# Patient Record
Sex: FEMALE | Race: WHITE | NOT HISPANIC OR LATINO | Employment: OTHER | URBAN - METROPOLITAN AREA
[De-identification: names, ages, dates, MRNs, and addresses within clinical notes are randomized per-mention and may not be internally consistent; named-entity substitution may affect disease eponyms.]

---

## 2017-04-06 ENCOUNTER — ALLSCRIPTS OFFICE VISIT (OUTPATIENT)
Dept: OTHER | Facility: OTHER | Age: 62
End: 2017-04-06

## 2018-01-13 VITALS — WEIGHT: 233 LBS | HEART RATE: 64 BPM | SYSTOLIC BLOOD PRESSURE: 154 MMHG | DIASTOLIC BLOOD PRESSURE: 84 MMHG

## 2018-01-25 ENCOUNTER — OFFICE VISIT (OUTPATIENT)
Dept: NEUROLOGY | Facility: CLINIC | Age: 63
End: 2018-01-25
Payer: COMMERCIAL

## 2018-01-25 VITALS
SYSTOLIC BLOOD PRESSURE: 172 MMHG | DIASTOLIC BLOOD PRESSURE: 81 MMHG | BODY MASS INDEX: 34.36 KG/M2 | HEIGHT: 69 IN | HEART RATE: 68 BPM | WEIGHT: 232 LBS

## 2018-01-25 DIAGNOSIS — G25.0 TREMOR, ESSENTIAL: Primary | ICD-10-CM

## 2018-01-25 PROCEDURE — 99214 OFFICE O/P EST MOD 30 MIN: CPT | Performed by: PSYCHIATRY & NEUROLOGY

## 2018-01-25 RX ORDER — METOPROLOL SUCCINATE 50 MG/1
50 TABLET, EXTENDED RELEASE ORAL DAILY
Refills: 0 | Status: CANCELLED | OUTPATIENT
Start: 2018-01-25

## 2018-01-25 RX ORDER — METOPROLOL SUCCINATE 100 MG/1
100 TABLET, EXTENDED RELEASE ORAL DAILY
Refills: 3 | COMMUNITY
Start: 2017-12-14 | End: 2018-01-25 | Stop reason: SDUPTHER

## 2018-01-25 RX ORDER — METOPROLOL SUCCINATE 100 MG/1
100 TABLET, EXTENDED RELEASE ORAL DAILY
Qty: 30 TABLET | Refills: 11 | Status: SHIPPED | OUTPATIENT
Start: 2018-01-25 | End: 2019-01-23 | Stop reason: SDUPTHER

## 2018-01-25 RX ORDER — KETOCONAZOLE 20 MG/G
CREAM TOPICAL
COMMUNITY
Start: 2016-04-14

## 2018-01-25 RX ORDER — AMOXICILLIN AND CLAVULANATE POTASSIUM 875; 125 MG/1; MG/1
1 TABLET, FILM COATED ORAL 2 TIMES DAILY
COMMUNITY
Start: 2018-01-17 | End: 2018-01-27

## 2018-01-25 RX ORDER — HYDROCHLOROTHIAZIDE 12.5 MG/1
1 TABLET ORAL DAILY
COMMUNITY
Start: 2017-12-04

## 2018-01-25 NOTE — PATIENT INSTRUCTIONS
Essential tremor with slight progression but functioning well  We discussed option to increase metoprolol vs adding another medication such as primidone or topiramate  We opted to increase metoprolol  Will increase to Metoprolol XL 100mg daily  She will check her blood pressure and pulse at home and call should she have hypotension, lightheadedness, fatigue or low pulse

## 2018-01-25 NOTE — PROGRESS NOTES
Patient ID: Jennifer Feldman is a 57 y o  female  Assessment/Plan:    Essential tremor with slight progression but functioning well  We discussed option to increase metoprolol vs adding another medication such as primidone or topiramate  We opted to increase metoprolol  Will increase to Metoprolol XL 100mg daily  She will check her blood pressure and pulse at home and call should she have hypotension, lightheadedness, fatigue or low pulse  Subjective:    Patient with essential tremor who presents for follow up  To review, first seen 10/27/14 but tremors were mild so opted to have no treatment  She was diagnosed in [place] by her PCP who noted a vocal tremor during a routine visit in August 2010  Tremor has gradually spread to her head and hands  Given progression she was started on Metoprolol with improvement  Her mother had tremors  She is doing well overall  Now her right hand is worse than the left  She is eating and drinking well  She is writing without issues however the tremors will sometimes appear  She has stopped serving others hot drinks  She is able to perform all of her ADLs well  Stress will make it worse or if someone points them out  She can have some mild issues putting on makeup  Every once in awhile she notices her jaw shake but not her head  Her  will tell her the head shakes at times  The following portions of the patient's history were reviewed and updated as appropriate: allergies, current medications, past family history, past medical history, past social history, past surgical history and problem list            Objective:    Blood pressure (!) 172/81, pulse 68, height 5' 9" (1 753 m), weight 105 kg (232 lb)  Physical Exam   Neurological: She has normal strength and normal reflexes  Gait normal    Psychiatric: Her speech is normal        Neurological Exam    Mental Status  The patient is alert and oriented to person, place, time, and situation   Her speech is normal  She has normal attention span and concentration  She has a normal fund of knowledge  Cranial Nerves  CN I: The patient has not tested  CN II: The patient's visual acuity and visual fields are normal   CN III, IV, VI:  The patient's pupils are equal  Her right pupil is, regular in shape  Her left pupil is, regular in shape  CN VII:  The patient has symmetric facial movement  CN VIII:  The patient's hearing is normal   CN XI: The patient's shoulder shrug strength is normal   CN XII: The patient's tongue is midline without atrophy or fasciculations  Motor   Her overall muscle tone is normal throughout  Her strength is 5/5 throughout all four extremities  Sensory  The patient's sensation is to light touch  Reflexes  Deep tendon reflexes are 2+ and symmetric in all four extremities with downgoing toes bilaterally  Gait and Coordination  The patient has normal gait and station  Mild postural tremors bilaterally  Mild tremor on finger to nose bilaterally worse on the right  Intermittent mild no-no head tremor  Subtle vocal tremor  Spirals with moderate tremor bilaterally  Handwriting normal but large  ROS:    Review of Systems   Constitutional: Negative  HENT: Positive for dental problem  Eyes: Negative  Respiratory: Negative  Cardiovascular: Negative  Gastrointestinal: Negative  Endocrine: Negative  Genitourinary: Positive for frequency  Musculoskeletal: Negative  Skin: Negative  Allergic/Immunologic: Negative  Neurological: Positive for tremors  Hematological: Negative  Psychiatric/Behavioral: Negative

## 2019-01-23 DIAGNOSIS — G25.0 TREMOR, ESSENTIAL: ICD-10-CM

## 2019-01-24 RX ORDER — METOPROLOL SUCCINATE 100 MG/1
TABLET, EXTENDED RELEASE ORAL
Qty: 30 TABLET | Refills: 11 | Status: SHIPPED | OUTPATIENT
Start: 2019-01-24 | End: 2020-09-03 | Stop reason: SDUPTHER

## 2020-09-03 ENCOUNTER — OFFICE VISIT (OUTPATIENT)
Dept: NEUROLOGY | Facility: CLINIC | Age: 65
End: 2020-09-03
Payer: COMMERCIAL

## 2020-09-03 VITALS — TEMPERATURE: 98.2 F | SYSTOLIC BLOOD PRESSURE: 135 MMHG | HEART RATE: 63 BPM | DIASTOLIC BLOOD PRESSURE: 67 MMHG

## 2020-09-03 DIAGNOSIS — G25.0 TREMOR, ESSENTIAL: Primary | ICD-10-CM

## 2020-09-03 PROCEDURE — 99214 OFFICE O/P EST MOD 30 MIN: CPT | Performed by: PSYCHIATRY & NEUROLOGY

## 2020-09-03 RX ORDER — METOPROLOL SUCCINATE 100 MG/1
100 TABLET, EXTENDED RELEASE ORAL DAILY
Qty: 90 TABLET | Refills: 3 | Status: SHIPPED | OUTPATIENT
Start: 2020-09-03

## 2020-09-03 RX ORDER — LISINOPRIL AND HYDROCHLOROTHIAZIDE 12.5; 1 MG/1; MG/1
1 TABLET ORAL DAILY
COMMUNITY
Start: 2020-06-30

## 2020-09-03 NOTE — PATIENT INSTRUCTIONS
We discussed option to increase metoprolol vs adding another medication such as primidone or topiramate    Also discussed MRI focused ultrasound surgery

## 2020-09-03 NOTE — PROGRESS NOTES
Patient ID: Buck Ely is a 77 y o  female  Assessment/Plan:    Tremor, essential  Progression of tremor  She has been able to adapt to her tremor throughout the years and is not keen on adding another medications  We did discuss options including  adding another medication such as primidone or topiramate  We discussed surgical options including DBS (which she is not interested in ) and MRI focused ultrasound  Questions regarding tremor, prognosis and options answered  Diagnoses and all orders for this visit:    Tremor, essential  -     metoprolol succinate (TOPROL-XL) 100 mg 24 hr tablet; Take 1 tablet (100 mg total) by mouth daily    Other orders  -     lisinopril-hydrochlorothiazide (PRINZIDE,ZESTORETIC) 10-12 5 MG per tablet; Take 1 tablet by mouth daily           Subjective:    Ms Kat Rose is a woman  essential tremor who presents for follow up  To review, first seen 10/27/14 but tremors were mild so opted to have no treatment  She was diagnosed in [place] by her PCP who noted a vocal tremor during a routine visit in August 2010  Tremor has gradually spread to her head and hands  Given progression she was started on Metoprolol with improvement  Her mother had tremors       Last seen in 2018  Tremor has worsened  She has bilateral hand tremors with action which is worse on the right  She is able to use utensils to eat without difficulty and drink  She will have trouble with carrying hot drinks or drinking from a mug  Handwriting is tremulous at times but legible  She is able to perform all of her ADLs without difficulty  Stress will make it worse  She can trouble putting on makeup  Every once in awhile she notices her jaw shake  Others have mentions an occasional head shake or vocal tremor  She remains on metoprolol with no side effects and partial improvement of tremor           The following portions of the patient's history were reviewed and updated as appropriate: allergies, current medications, past family history, past medical history, past social history, past surgical history and problem list          Objective:    Blood pressure 135/67, pulse 63, temperature 98 2 °F (36 8 °C)  Physical Exam  Eyes:      Extraocular Movements: Extraocular movements intact  Pupils: Pupils are equal, round, and reactive to light  Neurological:      Deep Tendon Reflexes: Strength normal    Psychiatric:         Speech: Speech normal          Neurological Exam  Mental Status   Oriented to person, place, time and situation  Recent and remote memory are intact  Speech is normal  Follows complex commands  Attention and concentration are normal     Cranial Nerves  CN II: Right funduscopic exam: not visualized  Left funduscopic exam: not visualized  CN III, IV, VI: Extraocular movements intact bilaterally  Pupils equal round and reactive to light bilaterally  CN V: Facial sensation is normal   CN VII: Full and symmetric facial movement  CN VIII: Hearing is normal   CN XI: Shoulder shrug strength is normal   Cranial nerve exam limited due to masking and patient comfort in setting of COVID  Motor   Normal muscle tone  Strength is 5/5 throughout all four extremities  Sensory  Light touch is normal in upper and lower extremities  Coordination  Right: Finger-to-nose abnormality: Rapid alternating movement normal   Left: Finger-to-nose abnormality: Rapid alternating movement normal   Moderate intentional tremor  Mild postural tremor  No rest tremor  Rare no-no had tremor  No vocal tremor  Handwriting normal    Spirals with moderate tremor       Gait  Casual gait is normal including stance, stride, and arm swing  Able to rise from chair without using arms  ROS:    Review of Systems   Constitutional: Negative  Negative for appetite change and fever  HENT: Negative  Negative for hearing loss, tinnitus, trouble swallowing and voice change  Eyes: Negative  Negative for photophobia and pain     Respiratory: Negative  Negative for shortness of breath  Cardiovascular: Negative  Negative for palpitations  Gastrointestinal: Negative  Negative for nausea and vomiting  Endocrine: Negative  Negative for cold intolerance  Genitourinary: Negative  Negative for dysuria, frequency and urgency  Musculoskeletal: Negative  Negative for myalgias and neck pain  Skin: Negative  Negative for rash  Allergic/Immunologic: Negative  Neurological: Positive for tremors (Hands daily)  Negative for dizziness, seizures, syncope, facial asymmetry, speech difficulty, weakness, light-headedness, numbness and headaches  Hematological: Negative  Does not bruise/bleed easily  Psychiatric/Behavioral: Negative  Negative for confusion, hallucinations and sleep disturbance  All other systems reviewed and are negative  Review of system was personally reviewed

## 2020-09-03 NOTE — ASSESSMENT & PLAN NOTE
Progression of tremor  She has been able to adapt to her tremor throughout the years and is not keen on adding another medications  We did discuss options including  adding another medication such as primidone or topiramate  We discussed surgical options including DBS (which she is not interested in ) and MRI focused ultrasound  Questions regarding tremor, prognosis and options answered

## 2020-09-09 RX ORDER — METOPROLOL SUCCINATE 100 MG/1
100 TABLET, EXTENDED RELEASE ORAL
COMMUNITY
Start: 2020-09-03 | End: 2021-10-22 | Stop reason: SDUPTHER

## 2020-09-09 RX ORDER — LISINOPRIL AND HYDROCHLOROTHIAZIDE 10; 12.5 MG/1; MG/1
1 TABLET ORAL
COMMUNITY
Start: 2020-06-30 | End: 2020-09-11 | Stop reason: SDUPTHER

## 2020-09-09 NOTE — PROGRESS NOTES
CC:   Chief Complaint   Patient presents with   • Establish Care     NP. old PCP was in [facility], retired. States she had a flu shot, up to date on mammo. Has essential tremors. Sees ortho for arthritis     Subjective   Alejandra Rocha is a 65 y.o. female presenting to establish care and discuss Chronic issues  ?  No acute complaints  Moved here a year and a half ago  Previously in [place]    #Type 2 diabetes: Last hemoglobin A1c: 6.5 10/2019  Current regimen: Declines diabetic meds, wants to try diet and weight loss.  Does not check sugars at home  Diabetic foot exam: last year  Diabetic eye exam: Earlier this year, may need to obtain records  Last Microalbumin: Due  Was going to the gym, then everything shut down   Was playing pickleball, arthritis in her knees    #Bilateral knee pain: follows with ortho , Provided her with a brace    #Essential tremor: Has been worsening over the last few years, saw neurology up in [facility], started on metoprolol    #HTN: current regimen: Currently on lisinopril-HCTZ 10-12.5 mg daily And metoprolol 100 mg daily as above  Denies any chest pain, shortness of breath, leg swelling  Patient doesnot check blood pressures at home    #HDL: not currently on  Statin, on baby asa  The ASCVD Risk score (Rupesh DC Jr., et al., 2013) failed to calculate for the following reasons:    Cannot find a previous HDL lab    Cannot find a previous total cholesterol lab      #Vitamin D deficiency Currently on 1000 IU daily  No results found for: VITD25      Work: Retired, 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 Patient is postmenopausal.  OB history:   GYN history:    Last mammogram 2020  Last diabetic eye exam 2019  Last DEXA scan 2019 And normal bone density      Patient History   Past Medical History:   Diagnosis Date   •  Arthritis    • Essential tremor    • Hypertension        History reviewed. No pertinent surgical history.  Family History   Problem Relation Age of Onset   • Arthritis Biological Mother    • Breast cancer Biological Mother    • Melanoma Biological Mother    • ALS Biological Father    • Kidney disease Biological Daughter      No Known Allergies  Social History     Socioeconomic History   • Marital status:      Spouse name: None   • Number of children: None   • Years of education: None   • Highest education level: None   Occupational History   • None   Social Needs   • Financial resource strain: None   • Food insecurity:     Worry: None     Inability: None   • Transportation needs:     Medical: None     Non-medical: None   Tobacco Use   • Smoking status: Never Smoker   • Smokeless tobacco: Never Used   Substance and Sexual Activity   • Alcohol use: Yes     Comment: Socially   • Drug use: Never   • Sexual activity: None   Lifestyle   • Physical activity:     Days per week: None     Minutes per session: None   • Stress: None   Relationships   • Social connections:     Talks on phone: None     Gets together: None     Attends [employer]ism service: None     Active member of club or organization: None     Attends meetings of clubs or organizations: None     Relationship status: None   • Intimate partner violence:     Fear of current or ex partner: None     Emotionally abused: None     Physically abused: None     Forced sexual activity: None   Other Topics Concern   • None   Social History Narrative   • None       Current Outpatient Medications:   •  aspirin 81 mg enteric coated tablet, Take 81 mg by mouth daily., Disp: , Rfl:   •  cholecalciferol, vitamin D3, 1,000 unit (25 mcg) tablet, Take 1,000 Units by mouth., Disp: , Rfl:   •  lisinopriL-hydrochlorothiazide (PRINZIDE,ZESTORETIC) 10-12.5 mg per tablet, Take 1 tablet by mouth daily., Disp: 90 tablet, Rfl: 3  •  metoprolol succinate XL (TOPROL-XL) 100 mg 24 hr tablet,  "Take 100 mg by mouth., Disp: , Rfl:   •  multivitamin tablet, Take by mouth daily., Disp: , Rfl:        Review of Systems   Review of Systems   Constitutional: Negative.    HENT: Negative.    Eyes: Negative.    Respiratory: Negative.    Cardiovascular: Negative.    Gastrointestinal: Negative.    Endocrine: Negative.    Genitourinary: Negative.    Musculoskeletal: Positive for arthralgias and joint swelling.   Skin: Negative.    Neurological: Positive for tremors.   Hematological: Negative.    Psychiatric/Behavioral: Negative.          Objective   Physical Exam:  Vitals:    09/11/20 0956 09/11/20 1019   BP: (!) 144/90 130/78   BP Location: Left upper arm    Patient Position: Sitting    Pulse: 75    Resp: 16    SpO2: 99%    Weight: 108 kg (238 lb 9.6 oz)    Height: 1.778 m (5' 10\")      Physical Exam   Constitutional: She is oriented to person, place, and time. She appears well-developed and well-nourished.   HENT:   Head: Normocephalic and atraumatic.   Eyes: Conjunctivae and EOM are normal.   Neck: Normal range of motion. Neck supple.   Cardiovascular: Normal rate, regular rhythm and normal heart sounds.   Pulmonary/Chest: Effort normal and breath sounds normal.   Abdominal: Soft. Bowel sounds are normal.   Musculoskeletal: Normal range of motion. She exhibits no edema.   Neurological: She is alert and oriented to person, place, and time. She displays tremor.   Skin: Skin is warm and dry.   Psychiatric: She has a normal mood and affect. Her behavior is normal. Judgment and thought content normal.       ?  Assessment/Plan   Alejandra Rocha is a 65 y.o. female presenting for   Chief Complaint   Patient presents with   • Establish Care     NP. old PCP was in [facility], retired. States she had a flu shot, up to date on mammo. Has essential tremors. Sees ortho for arthritis      Diagnosis Plan   1. Type 2 diabetes mellitus without complication, without long-term current use of insulin (CMS/[facility])  CBC and Differential    " Comprehensive metabolic panel    Microalbumin/Creatinine Ur Random    Hemoglobin A1c    CBC and Differential    Comprehensive metabolic panel    Microalbumin/Creatinine Ur Random    Hemoglobin A1c    Currently diet controlled, recheck hemoglobin A1c.  Due for diabetic foot exam (not done today).  If well controlled follow-up 6 months, if not 3 months   2. Hypertension associated with diabetes (CMS/[facility])  lisinopriL-hydrochlorothiazide (PRINZIDE,ZESTORETIC) 10-12.5 mg per tablet    Currently well controlled on medication, continue   3. Hyperlipidemia associated with type 2 diabetes mellitus (CMS/[facility])  Lipid panel    Lipid panel    Not currently on statin, recheck cholesterol and recalculate ASCVD risk-- Has been hypertriglyceridemia in the past, consider fish oil supplement   4. Essential tremor  TSH w reflex FT4    TSH w reflex FT4    Follows with neurology, currently on metoprolol and well controlled   5. Chronic pain of both knees      Follows with Ortho, recent brace prescription   6. Vitamin D deficiency  Vitamin D 25 hydroxy    Vitamin D 25 hydroxy    Currently on supplement, recheck level and dose accordingly   7. Colon cancer screening  FIT    FIT    Fit test this year, colonoscopy next year   8. Need for hepatitis C screening test  Hepatitis C antibody    Hepatitis C antibody     #Health Maintenance: Mammogram and DEXA up-to-date, foot fit test today  - Immunizations: Up-to-date with flu, shingles, pneumonia vaccine- Need records from her previous PCP    Return in about 6 months (around 3/11/2021), or 3 months if DM not controlled on labs.

## 2020-09-11 ENCOUNTER — OFFICE VISIT (OUTPATIENT)
Dept: FAMILY MEDICINE | Facility: CLINIC | Age: 65
End: 2020-09-11
Payer: COMMERCIAL

## 2020-09-11 VITALS
OXYGEN SATURATION: 99 % | BODY MASS INDEX: 34.16 KG/M2 | DIASTOLIC BLOOD PRESSURE: 78 MMHG | SYSTOLIC BLOOD PRESSURE: 130 MMHG | RESPIRATION RATE: 16 BRPM | WEIGHT: 238.6 LBS | HEIGHT: 70 IN | HEART RATE: 75 BPM

## 2020-09-11 DIAGNOSIS — I15.2 HYPERTENSION ASSOCIATED WITH DIABETES (CMS/HCC): ICD-10-CM

## 2020-09-11 DIAGNOSIS — Z11.59 NEED FOR HEPATITIS C SCREENING TEST: ICD-10-CM

## 2020-09-11 DIAGNOSIS — M25.562 CHRONIC PAIN OF BOTH KNEES: ICD-10-CM

## 2020-09-11 DIAGNOSIS — G89.29 CHRONIC PAIN OF BOTH KNEES: ICD-10-CM

## 2020-09-11 DIAGNOSIS — E78.5 HYPERLIPIDEMIA ASSOCIATED WITH TYPE 2 DIABETES MELLITUS (CMS/HCC): ICD-10-CM

## 2020-09-11 DIAGNOSIS — E11.69 HYPERLIPIDEMIA ASSOCIATED WITH TYPE 2 DIABETES MELLITUS (CMS/HCC): ICD-10-CM

## 2020-09-11 DIAGNOSIS — E11.59 HYPERTENSION ASSOCIATED WITH DIABETES (CMS/HCC): ICD-10-CM

## 2020-09-11 DIAGNOSIS — E55.9 VITAMIN D DEFICIENCY: ICD-10-CM

## 2020-09-11 DIAGNOSIS — E11.9 TYPE 2 DIABETES MELLITUS WITHOUT COMPLICATION, WITHOUT LONG-TERM CURRENT USE OF INSULIN (CMS/HCC): Primary | ICD-10-CM

## 2020-09-11 DIAGNOSIS — M25.561 CHRONIC PAIN OF BOTH KNEES: ICD-10-CM

## 2020-09-11 DIAGNOSIS — G25.0 ESSENTIAL TREMOR: ICD-10-CM

## 2020-09-11 DIAGNOSIS — Z12.11 COLON CANCER SCREENING: ICD-10-CM

## 2020-09-11 PROCEDURE — 99204 OFFICE O/P NEW MOD 45 MIN: CPT | Performed by: FAMILY MEDICINE

## 2020-09-11 RX ORDER — LISINOPRIL AND HYDROCHLOROTHIAZIDE 10; 12.5 MG/1; MG/1
1 TABLET ORAL DAILY
Qty: 90 TABLET | Refills: 3 | Status: SHIPPED | OUTPATIENT
Start: 2020-09-11 | End: 2021-09-10

## 2020-09-11 RX ORDER — CHOLECALCIFEROL (VITAMIN D3) 25 MCG
1000 TABLET ORAL
COMMUNITY

## 2020-09-11 RX ORDER — ASPIRIN 81 MG/1
81 TABLET ORAL DAILY
COMMUNITY
Start: 2019-11-08 | End: 2021-10-22

## 2020-09-11 ASSESSMENT — ENCOUNTER SYMPTOMS
ARTHRALGIAS: 1
RESPIRATORY NEGATIVE: 1
ENDOCRINE NEGATIVE: 1
CARDIOVASCULAR NEGATIVE: 1
TREMORS: 1
PSYCHIATRIC NEGATIVE: 1
JOINT SWELLING: 1
CONSTITUTIONAL NEGATIVE: 1
GASTROINTESTINAL NEGATIVE: 1
HEMATOLOGIC/LYMPHATIC NEGATIVE: 1
EYES NEGATIVE: 1

## 2020-09-11 ASSESSMENT — PAIN SCALES - GENERAL: PAINLEVEL: 0-NO PAIN

## 2020-11-12 LAB — FIT DNA: NEGATIVE

## 2020-11-23 LAB — HEMOCCULT STL QL IA: NEGATIVE

## 2020-12-08 ENCOUNTER — TELEPHONE (OUTPATIENT)
Dept: FAMILY MEDICINE | Facility: CLINIC | Age: 65
End: 2020-12-08

## 2021-01-22 ENCOUNTER — TELEPHONE (OUTPATIENT)
Dept: FAMILY MEDICINE | Facility: CLINIC | Age: 66
End: 2021-01-22

## 2021-01-22 NOTE — TELEPHONE ENCOUNTER
LM for patient, we got an MRO record request mailed to us on her behalf. However there's nothing filled out on her end. [facility] sent us this request. I just called patient to clarify what was needed.

## 2021-03-30 DIAGNOSIS — Z23 ENCOUNTER FOR IMMUNIZATION: ICD-10-CM

## 2021-10-13 DIAGNOSIS — E11.59 HYPERTENSION ASSOCIATED WITH DIABETES (CMS/HCC): ICD-10-CM

## 2021-10-13 DIAGNOSIS — I15.2 HYPERTENSION ASSOCIATED WITH DIABETES (CMS/HCC): ICD-10-CM

## 2021-10-13 RX ORDER — LISINOPRIL AND HYDROCHLOROTHIAZIDE 10; 12.5 MG/1; MG/1
TABLET ORAL
Qty: 30 TABLET | Refills: 0 | Status: SHIPPED | OUTPATIENT
Start: 2021-10-13 | End: 2021-10-22 | Stop reason: SDUPTHER

## 2021-10-20 ASSESSMENT — ENCOUNTER SYMPTOMS
GASTROINTESTINAL NEGATIVE: 1
HEMATOLOGIC/LYMPHATIC NEGATIVE: 1
ENDOCRINE NEGATIVE: 1
EYES NEGATIVE: 1
PSYCHIATRIC NEGATIVE: 1
TREMORS: 1
CARDIOVASCULAR NEGATIVE: 1
JOINT SWELLING: 1
RESPIRATORY NEGATIVE: 1
CONSTITUTIONAL NEGATIVE: 1
ARTHRALGIAS: 1

## 2021-10-20 NOTE — PROGRESS NOTES
CC:   Chief Complaint   Patient presents with   • Follow-up     pt. presents for a follow up. pt has no concerns at this time. Eye Exam at [facility] 2021     Subjective   Alejandra Rocha is a 66 y.o. female presenting for dollow-up  ?  No acute complaints    #Type 2 diabetes: Last hemoglobin A1c: 6.5 10/2019  Current regimen: Declines diabetic meds, wants to try diet and weight loss.  Does not check sugars at home  Diabetic foot exam: due  Diabetic eye exam: Earlier this year, may need to obtain records  Last Microalbumin: Due  Was going to the gym, then everything shut down   Was playing pickleball, arthritis in her knees    #Essential tremor: Has been worsening over the last few years, saw neurology up in [facility], started on metoprolol  Will be seeing them     #HTN: current regimen: Currently on lisinopril-HCTZ 10-12.5 mg daily And metoprolol 100 mg daily as above  Denies any chest pain, shortness of breath, leg swelling  Patient doesnot check blood pressures at home    #HDL: not currently on  Statin, on baby asa  The ASCVD Risk score (Rupeshhenry CATHERINE Jr., et al., 2013) failed to calculate for the following reasons:    Cannot find a previous HDL lab    Cannot find a previous total cholesterol lab    #Vitamin D deficiency Currently on 1000 IU daily  No results found for: VITD25    Work: Retired, 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 Patient is postmenopausal.  OB history:   GYN history:    Last mammogram 2020  Last diabetic eye exam 2019  Last DEXA scan 2019 And normal bone density      Review of Systems   Review of Systems   Constitutional: Negative.    HENT: Negative.    Eyes: Negative.    Respiratory: Negative.    Cardiovascular: Negative.    Gastrointestinal: Negative.    Endocrine: Negative.    Genitourinary: Negative.   "  Musculoskeletal: Positive for arthralgias and joint swelling.   Skin: Negative.    Neurological: Positive for tremors.   Hematological: Negative.    Psychiatric/Behavioral: Negative.          Objective   Physical Exam:  Vitals:    10/22/21 1043 10/22/21 1106   BP: (!) 144/86 130/78   BP Location: Right upper arm    Patient Position: Sitting    Pulse: 67    Resp: 17    Temp: 36.2 °C (97.2 °F)    TempSrc: Temporal    SpO2: 96%    Weight: 107 kg (235 lb)    Height: 1.753 m (5' 9\")      Physical Exam  Constitutional:       Appearance: She is well-developed.   HENT:      Head: Normocephalic and atraumatic.   Eyes:      Conjunctiva/sclera: Conjunctivae normal.   Cardiovascular:      Rate and Rhythm: Normal rate and regular rhythm.      Heart sounds: Normal heart sounds.   Pulmonary:      Effort: Pulmonary effort is normal.      Breath sounds: Normal breath sounds.   Abdominal:      General: Bowel sounds are normal.      Palpations: Abdomen is soft.   Musculoskeletal:         General: Normal range of motion.      Cervical back: Normal range of motion and neck supple.   Skin:     General: Skin is warm and dry.   Neurological:      Mental Status: She is alert and oriented to person, place, and time.      Motor: Tremor present.   Psychiatric:         Behavior: Behavior normal.         Thought Content: Thought content normal.         Judgment: Judgment normal.       Diabetic foot exam:  Right Foot: skin intact, neurovascularly intact, monofilament felt in all fields  Left Foot: skin intact, neurovascularly intact Monofilament felt in all fields      ?  Assessment/Plan   Alejandra Rocha is a 66 y.o. female presenting for   Chief Complaint   Patient presents with   • Follow-up     pt. presents for a follow up. pt has no concerns at this time. Eye Exam at [facility] 09/2021      Diagnosis Plan   1. Type 2 diabetes mellitus without complication, without long-term current use of insulin (CMS/[facility])  CBC and Differential    " Comprehensive metabolic panel    Hemoglobin A1c    Microalbumin/Creatinine Ur Random    DIABETIC FOOT EXAM    CBC and Differential    Comprehensive metabolic panel    Hemoglobin A1c    Microalbumin/Creatinine Ur Random    Only 1 A1c 6.5-- Officially due for repeat blood work, will check and dose accordingly; Need eye doctor exam- Low threshold t/c Jardiance vs metformin   2. Hypertension associated with diabetes (CMS/[facility])  Comprehensive metabolic panel    Comprehensive metabolic panel    lisinopriL-hydrochlorothiazide 10-12.5 mg per tablet    Well-controlled on current regimen, recheck labs as above; Initially elevated, improved on repeat, can consider increasing dose if needed   3. Hyperlipidemia associated with type 2 diabetes mellitus (CMS/[facility])  Lipid panel    Lipid panel    Recheck lipids and dose according-- Recommend discontinue Baby aspirin as Primary prevention   4. Essential tremor  metoprolol succinate  mg 24 hr tablet    On metoprolol through neurology, would not increase given her heart rate.  They are considering primidone, okay to try at low doses Through neurology   5. Vitamin D deficiency  Vitamin D 25 hydroxy    Vitamin D 25 hydroxy    Recheck level and dose accordingly   6. Chronic pain of both knees      With overall arthralgias, will check BMP, if kidney function is normal, we can consider Celebrex to be used as needed, otherwise Tylenol up to 1000mg TID   7. Need for hepatitis C screening test  Hepatitis C antibody    Hepatitis C antibody   8. Colon cancer screening  Fecal Immunochemical    Fecal Immunochemical     #Health Maintenance: Mammogram and DEXA up-to-date, fit test today  - Immunizations: Up-to-date     Return in about 6 months (around 4/22/2022) for PE.

## 2021-10-22 ENCOUNTER — OFFICE VISIT (OUTPATIENT)
Dept: FAMILY MEDICINE | Facility: CLINIC | Age: 66
End: 2021-10-22
Payer: COMMERCIAL

## 2021-10-22 VITALS
HEIGHT: 69 IN | HEART RATE: 67 BPM | DIASTOLIC BLOOD PRESSURE: 78 MMHG | TEMPERATURE: 97.2 F | OXYGEN SATURATION: 96 % | RESPIRATION RATE: 17 BRPM | BODY MASS INDEX: 34.8 KG/M2 | SYSTOLIC BLOOD PRESSURE: 130 MMHG | WEIGHT: 235 LBS

## 2021-10-22 DIAGNOSIS — Z12.11 COLON CANCER SCREENING: ICD-10-CM

## 2021-10-22 DIAGNOSIS — E11.69 HYPERLIPIDEMIA ASSOCIATED WITH TYPE 2 DIABETES MELLITUS (CMS/HCC): ICD-10-CM

## 2021-10-22 DIAGNOSIS — E11.59 HYPERTENSION ASSOCIATED WITH DIABETES (CMS/HCC): ICD-10-CM

## 2021-10-22 DIAGNOSIS — M25.562 CHRONIC PAIN OF BOTH KNEES: ICD-10-CM

## 2021-10-22 DIAGNOSIS — M25.561 CHRONIC PAIN OF BOTH KNEES: ICD-10-CM

## 2021-10-22 DIAGNOSIS — G89.29 CHRONIC PAIN OF BOTH KNEES: ICD-10-CM

## 2021-10-22 DIAGNOSIS — E78.5 HYPERLIPIDEMIA ASSOCIATED WITH TYPE 2 DIABETES MELLITUS (CMS/HCC): ICD-10-CM

## 2021-10-22 DIAGNOSIS — Z11.59 NEED FOR HEPATITIS C SCREENING TEST: ICD-10-CM

## 2021-10-22 DIAGNOSIS — E11.9 TYPE 2 DIABETES MELLITUS WITHOUT COMPLICATION, WITHOUT LONG-TERM CURRENT USE OF INSULIN (CMS/HCC): Primary | ICD-10-CM

## 2021-10-22 DIAGNOSIS — G25.0 ESSENTIAL TREMOR: ICD-10-CM

## 2021-10-22 DIAGNOSIS — E55.9 VITAMIN D DEFICIENCY: ICD-10-CM

## 2021-10-22 DIAGNOSIS — I15.2 HYPERTENSION ASSOCIATED WITH DIABETES (CMS/HCC): ICD-10-CM

## 2021-10-22 PROCEDURE — 3078F DIAST BP <80 MM HG: CPT | Performed by: FAMILY MEDICINE

## 2021-10-22 PROCEDURE — 3008F BODY MASS INDEX DOCD: CPT | Performed by: FAMILY MEDICINE

## 2021-10-22 PROCEDURE — 3075F SYST BP GE 130 - 139MM HG: CPT | Performed by: FAMILY MEDICINE

## 2021-10-22 PROCEDURE — 99214 OFFICE O/P EST MOD 30 MIN: CPT | Performed by: FAMILY MEDICINE

## 2021-10-22 RX ORDER — LISINOPRIL AND HYDROCHLOROTHIAZIDE 10; 12.5 MG/1; MG/1
1 TABLET ORAL
Qty: 90 TABLET | Refills: 3 | Status: SHIPPED | OUTPATIENT
Start: 2021-10-22 | End: 2022-10-10

## 2021-10-22 RX ORDER — METOPROLOL SUCCINATE 100 MG/1
100 TABLET, EXTENDED RELEASE ORAL DAILY
Qty: 90 TABLET | Refills: 3 | Status: SHIPPED | OUTPATIENT
Start: 2021-10-22 | End: 2022-09-29

## 2021-11-05 LAB
BASOPHILS # BLD AUTO: 0.1 X10E3/UL (ref 0–0.2)
BASOPHILS NFR BLD AUTO: 1 %
EOSINOPHIL # BLD AUTO: 0.2 X10E3/UL (ref 0–0.4)
EOSINOPHIL NFR BLD AUTO: 3 %
ERYTHROCYTE [DISTWIDTH] IN BLOOD BY AUTOMATED COUNT: 13.1 % (ref 11.7–15.4)
HCT VFR BLD AUTO: 39.6 % (ref 34–46.6)
HGB BLD-MCNC: 13.2 G/DL (ref 11.1–15.9)
IMM GRANULOCYTES # BLD AUTO: 0 X10E3/UL (ref 0–0.1)
IMM GRANULOCYTES NFR BLD AUTO: 0 %
LYMPHOCYTES # BLD AUTO: 2.7 X10E3/UL (ref 0.7–3.1)
LYMPHOCYTES NFR BLD AUTO: 45 %
MCH RBC QN AUTO: 29.2 PG (ref 26.6–33)
MCHC RBC AUTO-ENTMCNC: 33.3 G/DL (ref 31.5–35.7)
MCV RBC AUTO: 88 FL (ref 79–97)
MONOCYTES # BLD AUTO: 0.5 X10E3/UL (ref 0.1–0.9)
MONOCYTES NFR BLD AUTO: 8 %
NEUTROPHILS # BLD AUTO: 2.6 X10E3/UL (ref 1.4–7)
NEUTROPHILS NFR BLD AUTO: 43 %
PLATELET # BLD AUTO: 253 X10E3/UL (ref 150–450)
RBC # BLD AUTO: 4.52 X10E6/UL (ref 3.77–5.28)
WBC # BLD AUTO: 6 X10E3/UL (ref 3.4–10.8)

## 2021-11-06 LAB
25(OH)D3+25(OH)D2 SERPL-MCNC: 61.2 NG/ML (ref 30–100)
ALBUMIN SERPL-MCNC: 4.5 G/DL (ref 3.8–4.8)
ALBUMIN/CREAT UR: 21 MG/G CREAT (ref 0–29)
ALBUMIN/GLOB SERPL: 1.6 {RATIO} (ref 1.2–2.2)
ALP SERPL-CCNC: 96 IU/L (ref 44–121)
ALT SERPL-CCNC: 40 IU/L (ref 0–32)
AST SERPL-CCNC: 32 IU/L (ref 0–40)
BILIRUB SERPL-MCNC: 0.2 MG/DL (ref 0–1.2)
BUN SERPL-MCNC: 20 MG/DL (ref 8–27)
BUN/CREAT SERPL: 24 (ref 12–28)
CALCIUM SERPL-MCNC: 10.1 MG/DL (ref 8.7–10.3)
CHLORIDE SERPL-SCNC: 102 MMOL/L (ref 96–106)
CHOLEST SERPL-MCNC: 178 MG/DL (ref 100–199)
CO2 SERPL-SCNC: 20 MMOL/L (ref 20–29)
CREAT SERPL-MCNC: 0.84 MG/DL (ref 0.57–1)
CREAT UR-MCNC: 144.7 MG/DL
GLOBULIN SER CALC-MCNC: 2.8 G/DL (ref 1.5–4.5)
GLUCOSE SERPL-MCNC: 101 MG/DL (ref 65–99)
HBA1C MFR BLD: 6.2 % (ref 4.8–5.6)
HCV AB S/CO SERPL IA: <0.1 S/CO RATIO (ref 0–0.9)
HDLC SERPL-MCNC: 47 MG/DL
LAB CORP EGFR IF AFRICN AM: 83 ML/MIN/1.73
LAB CORP EGFR IF NONAFRICN AM: 72 ML/MIN/1.73
LDLC SERPL CALC-MCNC: 108 MG/DL (ref 0–99)
MICROALBUMIN UR-MCNC: 30.7 UG/ML
POTASSIUM SERPL-SCNC: 4.3 MMOL/L (ref 3.5–5.2)
PROT SERPL-MCNC: 7.3 G/DL (ref 6–8.5)
SODIUM SERPL-SCNC: 141 MMOL/L (ref 134–144)
TRIGL SERPL-MCNC: 127 MG/DL (ref 0–149)
VLDLC SERPL CALC-MCNC: 23 MG/DL (ref 5–40)

## 2021-11-10 ENCOUNTER — TELEPHONE (OUTPATIENT)
Dept: FAMILY MEDICINE | Facility: CLINIC | Age: 66
End: 2021-11-10

## 2021-11-10 DIAGNOSIS — E78.5 HYPERLIPIDEMIA ASSOCIATED WITH TYPE 2 DIABETES MELLITUS (CMS/HCC): ICD-10-CM

## 2021-11-10 DIAGNOSIS — E11.9 TYPE 2 DIABETES MELLITUS WITHOUT COMPLICATION, WITHOUT LONG-TERM CURRENT USE OF INSULIN (CMS/HCC): Primary | ICD-10-CM

## 2021-11-10 DIAGNOSIS — E11.69 HYPERLIPIDEMIA ASSOCIATED WITH TYPE 2 DIABETES MELLITUS (CMS/HCC): ICD-10-CM

## 2021-11-10 NOTE — TELEPHONE ENCOUNTER
Patient returned call to discuss lab results. Please call patient back at her home phone number to discuss the results.

## 2022-04-06 ENCOUNTER — OFFICE VISIT (OUTPATIENT)
Dept: NEUROLOGY | Facility: CLINIC | Age: 67
End: 2022-04-06
Payer: COMMERCIAL

## 2022-04-06 VITALS
DIASTOLIC BLOOD PRESSURE: 69 MMHG | TEMPERATURE: 98.3 F | HEART RATE: 67 BPM | WEIGHT: 239.6 LBS | HEIGHT: 69 IN | BODY MASS INDEX: 35.49 KG/M2 | SYSTOLIC BLOOD PRESSURE: 157 MMHG

## 2022-04-06 DIAGNOSIS — G25.0 TREMOR, ESSENTIAL: Primary | ICD-10-CM

## 2022-04-06 PROCEDURE — 99214 OFFICE O/P EST MOD 30 MIN: CPT | Performed by: PSYCHIATRY & NEUROLOGY

## 2022-04-06 RX ORDER — METOPROLOL SUCCINATE 25 MG/1
25 TABLET, EXTENDED RELEASE ORAL DAILY
Qty: 90 TABLET | Refills: 2 | Status: SHIPPED | OUTPATIENT
Start: 2022-04-06

## 2022-04-06 NOTE — ASSESSMENT & PLAN NOTE
Progression of tremor  At this point although she has been able to adapt to her tremor throughout the years, it is now more notable by others and she is willing to consider increases in medications  We discussed options such as increasing Toprol Xl, or adding a medication  Will increase Toprol Xl to 125mg daily  If no improvement and no side effects she can further increase to 150mg daily  If she develops side effects (lightheadness/ fatigue, etc) she was instructed to return to 100mg daily and contact the office  We will then try adding a medication such as primidone or topiramate  Surgical options of or ET previously discussed and she was not interested

## 2022-04-06 NOTE — PROGRESS NOTES
Review of Systems   Constitutional: Negative  Negative for appetite change and fever  HENT: Negative  Negative for hearing loss, tinnitus, trouble swallowing and voice change  Eyes: Negative  Negative for photophobia and pain  Respiratory: Negative  Negative for shortness of breath  Cardiovascular: Negative  Negative for palpitations  Gastrointestinal: Negative  Negative for nausea and vomiting  Endocrine: Negative  Negative for cold intolerance  Genitourinary: Negative  Negative for dysuria, frequency and urgency  Musculoskeletal: Positive for myalgias  Negative for neck pain  Skin: Negative  Negative for rash  Neurological: Positive for tremors  Negative for dizziness, seizures, syncope, facial asymmetry, speech difficulty, weakness, light-headedness, numbness and headaches  Hematological: Negative  Does not bruise/bleed easily  Psychiatric/Behavioral: Negative  Negative for confusion, hallucinations and sleep disturbance

## 2022-04-06 NOTE — PROGRESS NOTES
Patient ID: Izzy Garcia is a 74 y o  female    Assessment/Plan:    Tremor, essential  Progression of tremor  At this point although she has been able to adapt to her tremor throughout the years, it is now more notable by others and she is willing to consider increases in medications  We discussed options such as increasing Toprol Xl, or adding a medication  Will increase Toprol Xl to 125mg daily  If no improvement and no side effects she can further increase to 150mg daily  If she develops side effects (lightheadness/ fatigue, etc) she was instructed to return to 100mg daily and contact the office  We will then try adding a medication such as primidone or topiramate  Surgical options of or ET previously discussed and she was not interested  Diagnoses and all orders for this visit:    Tremor, essential  -     metoprolol succinate (TOPROL-XL) 25 mg 24 hr tablet; Take 1 tablet (25 mg total) by mouth daily Take one daily in addition to 100mg tablet (total 125mg daily)        Subjective:      Izzy Garcia is a woman with essential tremor who presents for follow up  To review, first seen 10/27/14 but tremors were mild so opted to have no treatment  She was diagnosed in [place] by her PCP who noted a vocal tremor during a routine visit in August 2010  Tremor has gradually spread to her head and hands  Given progression she was started on Metoprolol with improvement  Her mother had tremors       Last seen in 2020 with progression of tremor but she was not interested adding another medications or any surgical options      Questions regarding tremor, prognosis and options answered       She remains on Toprol Xl 100mg daily with partial improvement of tremor  Bilateral right greater than left action tremors continue to progressively get worse  She has noticed that alcohol responsive  Her children and  have noted progression  A vocal tremor has become more notable   She has jaw tremor and head tremor  She is able to use utensils to eat without difficulty and drink  Tremors are notable and she would not serve others  She has trouble putting on makeup  Stress or emotions worsen tremors  Handwriting is tremulous at times but legible  She is able to perform all of her ADLs without difficulty  No changes in gait  Objective:    /69 (BP Location: Left arm, Patient Position: Sitting, Cuff Size: Large)   Pulse 67   Temp 98 3 °F (36 8 °C)   Ht 5' 9" (1 753 m)   Wt 109 kg (239 lb 9 6 oz)   BMI 35 38 kg/m²       Physical Exam  Vitals reviewed  Eyes:      Extraocular Movements: Extraocular movements intact  Pupils: Pupils are equal, round, and reactive to light  Neurological:      Deep Tendon Reflexes: Strength normal    Psychiatric:         Speech: Speech normal          Neurological Exam  Mental Status   Oriented to person, place, time and situation  Recent and remote memory are intact  Speech is normal  Follows complex commands  Attention and concentration are normal     Cranial Nerves  CN II: Visual fields full to confrontation  Right funduscopic exam: not visualized  Left funduscopic exam: not visualized  CN III, IV, VI: Extraocular movements intact bilaterally  Pupils equal round and reactive to light bilaterally  CN V: Facial sensation is normal   CN VII: Full and symmetric facial movement  CN VIII: Hearing is normal   CN IX, X: Palate elevates symmetrically  CN XI: Shoulder shrug strength is normal   CN XII: Tongue midline without atrophy or fasciculations  Motor   Normal muscle tone  Strength is 5/5 throughout all four extremities  Sensory  Light touch is normal in upper and lower extremities  Coordination  Right: Finger-to-nose abnormality: Rapid alternating movement normal   Left: Finger-to-nose abnormality: Rapid alternating movement normal   Mild jaw tremor  Mild no-no head tremor  Moderate postural tremors L>R  Moderate action tremors L>R  No rest tremor       Gait  Casual gait is normal including stance, stride, and arm swing  Able to rise from chair without using arms  Current Outpatient Medications on File Prior to Visit   Medication Sig Dispense Refill    cholecalciferol (VITAMIN D3) 1,000 units tablet Take 1,000 Units by mouth daily      lisinopril-hydrochlorothiazide (PRINZIDE,ZESTORETIC) 10-12 5 MG per tablet Take 1 tablet by mouth daily      metoprolol succinate (TOPROL-XL) 100 mg 24 hr tablet Take 1 tablet (100 mg total) by mouth daily 90 tablet 3    Multiple Vitamins-Minerals (MULTIVITAL PO) Take 1 capsule by mouth daily      aspirin 81 MG tablet Take 81 mg by mouth daily (Patient not taking: Reported on 4/6/2022 )      hydrochlorothiazide (HYDRODIURIL) 12 5 mg tablet Take 1 tablet by mouth daily (Patient not taking: Reported on 4/6/2022 )      ketoconazole (NIZORAL) 2 % cream Use as directed (Patient not taking: Reported on 4/6/2022 )       No current facility-administered medications on file prior to visit  Review of Systems   Constitutional: Negative  Negative for appetite change and fever  HENT: Negative  Negative for hearing loss, tinnitus, trouble swallowing and voice change  Eyes: Negative  Negative for photophobia and pain  Respiratory: Negative  Negative for shortness of breath  Cardiovascular: Negative  Negative for palpitations  Gastrointestinal: Negative  Negative for nausea and vomiting  Endocrine: Negative  Negative for cold intolerance  Genitourinary: Negative  Negative for dysuria, frequency and urgency  Musculoskeletal: Positive for myalgias  Negative for neck pain  Skin: Negative  Negative for rash  Neurological: Positive for tremors  Negative for dizziness, seizures, syncope, facial asymmetry, speech difficulty, weakness, light-headedness, numbness and headaches  Hematological: Negative  Does not bruise/bleed easily  Psychiatric/Behavioral: Negative    Negative for confusion, hallucinations and sleep disturbance     Review of system documented by the MA, was personally reviewed     Jacqueline Medel MD  Movement disorder physician  [address] [place] [address]

## 2022-04-07 LAB — SPECIMEN STATUS: NORMAL

## 2022-04-12 ENCOUNTER — TELEPHONE (OUTPATIENT)
Dept: FAMILY MEDICINE | Facility: CLINIC | Age: 67
End: 2022-04-12
Payer: COMMERCIAL

## 2022-04-12 DIAGNOSIS — Z12.11 SCREENING FOR COLON CANCER: Primary | ICD-10-CM

## 2022-04-12 NOTE — TELEPHONE ENCOUNTER
Pt called and has a stool kit with no lab order in it. She would like to know if this can be provided. She is ready to drop the sample off a [facility].

## 2022-04-13 ENCOUNTER — TELEMEDICINE (OUTPATIENT)
Dept: FAMILY MEDICINE | Facility: CLINIC | Age: 67
End: 2022-04-13
Payer: COMMERCIAL

## 2022-04-13 DIAGNOSIS — J01.10 ACUTE NON-RECURRENT FRONTAL SINUSITIS: Primary | ICD-10-CM

## 2022-04-13 PROCEDURE — 99213 OFFICE O/P EST LOW 20 MIN: CPT | Mod: 95 | Performed by: NURSE PRACTITIONER

## 2022-04-13 RX ORDER — AMOXICILLIN AND CLAVULANATE POTASSIUM 875; 125 MG/1; MG/1
1 TABLET, FILM COATED ORAL 2 TIMES DAILY
Qty: 14 TABLET | Refills: 0 | Status: SHIPPED | OUTPATIENT
Start: 2022-04-13 | End: 2022-04-21

## 2022-04-13 ASSESSMENT — ENCOUNTER SYMPTOMS
FATIGUE: 0
VOMITING: 0
NAUSEA: 0
FACIAL ASYMMETRY: 0
HEADACHES: 0
HOARSE VOICE: 0
SORE THROAT: 0
APPETITE CHANGE: 0
MUSCULOSKELETAL NEGATIVE: 1
LIGHT-HEADEDNESS: 0
DIARRHEA: 0
NECK PAIN: 0
CHEST TIGHTNESS: 0
FACIAL SWELLING: 0
SHORTNESS OF BREATH: 0
SWOLLEN GLANDS: 0
SINUS PRESSURE: 1
CHILLS: 0
HEMATOLOGIC/LYMPHATIC NEGATIVE: 1
UNEXPECTED WEIGHT CHANGE: 0
EYES NEGATIVE: 1
CONSTIPATION: 0
ENDOCRINE NEGATIVE: 1
ALLERGIC/IMMUNOLOGIC NEGATIVE: 1
DIZZINESS: 0
CHOKING: 0
PSYCHIATRIC NEGATIVE: 1
FEVER: 0
SINUS PAIN: 1
DIAPHORESIS: 0
SINUS COMPLAINT: 1
COUGH: 1

## 2022-04-13 NOTE — PROGRESS NOTES
[facility]  [address] [place] [address]  [phone]       Verification of Patient Location:  The patient affirms they are currently located in the following state: [place]    Request for Consent:   Audio and Video Encounter   Hello, my name is DanaCHRISTAL Horvath.  Before we proceed, can you please verify your identification by telling me your full name and date of birth?  Can you tell me who is in the room with you?    You and I are about to have a telemedicine check-in or visit because you have requested it.  This is a live video-conference.  I am a real person, speaking to you in real time.  There is no one else with me on the video-conference.  However, when we use ([employer], [employer], etc) it is important for you to know that the video-conference may not be secure or private.  I am not recording this conversation and I am asking you not to record it.  This telemedicine visit will be billed to your health insurance or you, if you are self-insured.  You understand you will be responsible for any copayments or coinsurances that apply to your telemedicine visit.  Communication platform used for this encounter:  Doximity     Before starting our telemedicine visit, I am required to get your consent for this virtual check-in or visit by telemedicine. Do you consent?            Patient Response to Request for Consent:  Yes        Reason for visit:   Chief Complaint   Patient presents with   • Sinus Problem      HPI   Alejandra Rocha is a 66 y.o. female who presents with sinus concerns     covid test home negativ    Sinus Problem  This is a new problem. Episode onset: had a cold for a week started with a cough mucus  Progression since onset: sunday increase sinus pressure pain  There has been no fever. Associated symptoms include congestion, coughing (sputum, yellow ) and sinus pressure. Pertinent negatives include no chills, diaphoresis, ear pain, headaches, hoarse voice,  neck pain, shortness of breath, sneezing, sore throat or swollen glands. Treatments tried: dayquil/ night quil, mucinex tylenol            Medical History:  Past Medical History:   Diagnosis Date   • Arthritis    • Essential tremor    • Hypertension        Surgical History:  No past surgical history on file.    Social History:  Social History     Social History Narrative   • Not on file       Family History:  Family History   Problem Relation Age of Onset   • Arthritis Biological Mother    • Breast cancer Biological Mother    • Melanoma Biological Mother    • ALS Biological Father    • Kidney disease Biological Daughter        Allergies:  Patient has no known allergies.    Current Medications:  Current Outpatient Medications   Medication Sig Dispense Refill   • amoxicillin-pot clavulanate (AUGMENTIN) 875-125 mg per tablet Take 1 tablet by mouth 2 (two) times a day for 7 days. 14 tablet 0   • cholecalciferol, vitamin D3, 1,000 unit (25 mcg) tablet Take 1,000 Units by mouth.     • lisinopriL-hydrochlorothiazide 10-12.5 mg per tablet Take 1 tablet by mouth once daily. 90 tablet 3   • metoprolol succinate  mg 24 hr tablet Take 1 tablet (100 mg total) by mouth daily. 90 tablet 3   • multivitamin tablet Take by mouth daily.       No current facility-administered medications for this visit.       Review of Systems:  Review of Systems   Constitutional: Negative for appetite change, chills, diaphoresis, fatigue, fever and unexpected weight change.   HENT: Positive for congestion, sinus pressure and sinus pain (left side face). Negative for ear pain, facial swelling, hearing loss, hoarse voice, mouth sores, nosebleeds, postnasal drip, sneezing and sore throat.         Nasal discharge yellow    Eyes: Negative.    Respiratory: Positive for cough (sputum, yellow ). Negative for choking, chest tightness and shortness of breath.    Cardiovascular: Negative for chest pain.   Gastrointestinal: Negative for constipation,  diarrhea, nausea and vomiting.   Endocrine: Negative.    Genitourinary: Negative.    Musculoskeletal: Negative.  Negative for neck pain.   Skin: Negative.    Allergic/Immunologic: Negative.    Neurological: Negative for dizziness, facial asymmetry, light-headedness and headaches.   Hematological: Negative.    Psychiatric/Behavioral: Negative.        Objective     Vital Signs:  There were no vitals filed for this visit.    BMI:  There is no height or weight on file to calculate BMI.     Physical Exam    Recent labs before today:     Lab Results   Component Value Date    WBC 6.0 11/05/2021    HGB 13.2 11/05/2021    HCT 39.6 11/05/2021     11/05/2021    CHOL 178 11/05/2021    TRIG 127 11/05/2021    HDL 47 11/05/2021    ALT 40 (H) 11/05/2021    AST 32 11/05/2021     11/05/2021    K 4.3 11/05/2021     11/05/2021    CREATININE 0.84 11/05/2021    BUN 20 11/05/2021    CO2 20 11/05/2021    HGBA1C 6.2 (H) 11/05/2021    MICROALBUR 30.7 11/05/2021        Assessment/Plan   Problem List Items Addressed This Visit    None     Visit Diagnoses     Acute non-recurrent frontal sinusitis    -  Primary    Relevant Medications    amoxicillin-pot clavulanate (AUGMENTIN) 875-125 mg per tablet      Patient sounds to have developed a sinus infection from her viral illness of last week with increased sinus pressure pain discolored mucus coming out no other signs suggesting Covid or flu.  Advised patient to do supportive care of Mucinex plain advised patient with history of high blood pressure stay away from anything that has a D.  Advised of saline irrigation.  Will start on Augmentin 1 pill in the morning 1 pill at night for 7 days    Procedures     Due to the national medical emergency, this visit was conducted via telephone/ video.     Because of the COVID-19 pandemic, in order to limit patient contact and promote the safety of patients and the healthcare team, I did not physically examine the patient.   There are no  Patient Instructions on file for this visit.           Time Spent in Medical Discussion During This Encounter:  21 minutes    CHRISTAL Rico  4/13/2022

## 2022-04-16 LAB — HEMOCCULT STL QL IA: NEGATIVE

## 2022-04-21 ASSESSMENT — ENCOUNTER SYMPTOMS
ARTHRALGIAS: 1
GASTROINTESTINAL NEGATIVE: 1
PSYCHIATRIC NEGATIVE: 1
TREMORS: 1
ENDOCRINE NEGATIVE: 1
EYES NEGATIVE: 1
CONSTITUTIONAL NEGATIVE: 1
HEMATOLOGIC/LYMPHATIC NEGATIVE: 1
JOINT SWELLING: 1
RESPIRATORY NEGATIVE: 1
CARDIOVASCULAR NEGATIVE: 1

## 2022-04-21 NOTE — PROGRESS NOTES
CC:   Chief Complaint   Patient presents with   • Annual Exam     Established patient - PE no complaints   Mammagram - scheduled    Eye doctor- scheduled next week      Subjective   Alejandra Rocha is a 66 y.o. female presenting for Annual exam  ?  No acute complaints    Recent sinus infection, started on Augmentin, symptoms cleared up except for cough, now dry cough     #Type 2 diabetes: Last hemoglobin A1c: 6.5 x2 in 2019  Current regimen: not on diabetic meds, controls with diet and weight loss.  Lab Results   Component Value Date    HGBA1C 6.2 (H) 2021   Does not check sugars at home  Diabetic foot exam: 10/22/2021  Diabetic eye exam: 2021  Last Microalbumin: 2021    #Essential tremor: Has been worsening over the last few years, saw neurology up in [facility], started on metoprolol  Recently increased to 125 mg daily    #HTN: current regimen: Currently on lisinopril-HCTZ 10-12.5 mg daily And metoprolol 125 mg daily as above  Denies any chest pain, shortness of breath, leg swelling  Patient does check blood pressures at home- <140/70s    #HDL: Cholesterol had increased, patient declined statin and wanted to do diet  The 10-year ASCVD risk score ([place] CATHERINE Jr., et al., 2013) is: 18.8%    Values used to calculate the score:      Age: 66 years      Sex: Female      Is Non- : No      Diabetic: Yes      Tobacco smoker: No      Systolic Blood Pressure: 128 mmHg      Is BP treated: Yes      HDL Cholesterol: 47 mg/dL      Total Cholesterol: 178 mg/dL    #Vitamin D deficiency Currently on 1000 IU daily  No results found for: VITD25    Work: Retired, previously a teacher of [employer]   Relationship: , lois , currently in 55+ community-- [employer]  Family: 4kids, 5 grandkids--- 14yo down to 4yo       LMP: No LMP recorded. Patient is postmenopausal.  OB history:   GYN history: Follows with gynecology    Last mammogram - 2021 and normal  Last DEXA  "scan 1/17/2019 And normal bone density, Repeat at age 68  Fit test 4/12/2022    Review of Systems   Review of Systems   Constitutional: Negative.    HENT: Negative.    Eyes: Negative.    Respiratory: Negative.    Cardiovascular: Negative.    Gastrointestinal: Negative.    Endocrine: Negative.    Genitourinary: Negative.    Musculoskeletal: Positive for arthralgias and joint swelling.   Skin: Negative.    Neurological: Positive for tremors.   Hematological: Negative.    Psychiatric/Behavioral: Negative.        Objective   Physical Exam:  Vitals:    04/22/22 0911   BP: 128/78   BP Location: Right upper arm   Patient Position: Sitting   Pulse: 64   Resp: 16   Temp: 36.4 °C (97.5 °F)   TempSrc: Temporal   SpO2: 98%   Weight: 109 kg (240 lb 3.2 oz)   Height: 1.753 m (5' 9\")     Physical Exam  Constitutional:       Appearance: She is well-developed.   HENT:      Head: Normocephalic and atraumatic.   Eyes:      Conjunctiva/sclera: Conjunctivae normal.   Cardiovascular:      Rate and Rhythm: Normal rate and regular rhythm.      Heart sounds: Normal heart sounds.   Pulmonary:      Effort: Pulmonary effort is normal.      Breath sounds: Normal breath sounds.   Abdominal:      General: Bowel sounds are normal.      Palpations: Abdomen is soft.   Musculoskeletal:         General: Normal range of motion.      Cervical back: Normal range of motion and neck supple.   Skin:     General: Skin is warm and dry.   Neurological:      Mental Status: She is alert and oriented to person, place, and time.      Motor: Tremor present.   Psychiatric:         Behavior: Behavior normal.         Thought Content: Thought content normal.         Judgment: Judgment normal.       ?  Assessment/Plan   Alejandra Rocha is a 66 y.o. female presenting for   Chief Complaint   Patient presents with   • Annual Exam     Established patient - PE no complaints   Mammagram - scheduled June   Eye doctor- scheduled next week       Diagnosis Plan   1. Encounter for " general adult medical examination without abnormal findings      Overall doing well, discussed health maintenance and chronic issues as below   2. Diet-controlled type 2 diabetes mellitus (CMS/HCC)      With some recent weight gain but for the most part improving, plan to repeat labs in 2 to 3 months, Hesitant to go on meds if she can avoid it   3. Hypertension associated with diabetes (CMS/HCC)      Well-controlled on current regimen, continue   4. Hyperlipidemia associated with type 2 diabetes mellitus (CMS/HCC)      Repeating lipids in a few weeks, hesitant to go on statin but his numbers increased despite lifestyle changes, should consider statin   5. BMI 35.0-35.9,adult      She states she knows what to eat, just needs to start eating it and become more active, we will continue to monitor   6. Essential tremor      Now on 125 mg metoprolol and feeling somewhat better controlled     #Health Maintenance: Mammogram and DEXA up-to-date, fit test UTD  - Immunizations: Up-to-date     Return in about 6 months (around 10/22/2022).

## 2022-04-22 ENCOUNTER — OFFICE VISIT (OUTPATIENT)
Dept: FAMILY MEDICINE | Facility: CLINIC | Age: 67
End: 2022-04-22
Payer: COMMERCIAL

## 2022-04-22 VITALS
DIASTOLIC BLOOD PRESSURE: 78 MMHG | TEMPERATURE: 97.5 F | SYSTOLIC BLOOD PRESSURE: 128 MMHG | WEIGHT: 240.2 LBS | RESPIRATION RATE: 16 BRPM | HEART RATE: 64 BPM | BODY MASS INDEX: 35.58 KG/M2 | OXYGEN SATURATION: 98 % | HEIGHT: 69 IN

## 2022-04-22 DIAGNOSIS — E11.69 HYPERLIPIDEMIA ASSOCIATED WITH TYPE 2 DIABETES MELLITUS (CMS/HCC): ICD-10-CM

## 2022-04-22 DIAGNOSIS — Z00.00 ENCOUNTER FOR GENERAL ADULT MEDICAL EXAMINATION WITHOUT ABNORMAL FINDINGS: Primary | ICD-10-CM

## 2022-04-22 DIAGNOSIS — E11.59 HYPERTENSION ASSOCIATED WITH DIABETES (CMS/HCC): ICD-10-CM

## 2022-04-22 DIAGNOSIS — I15.2 HYPERTENSION ASSOCIATED WITH DIABETES (CMS/HCC): ICD-10-CM

## 2022-04-22 DIAGNOSIS — E11.9 DIET-CONTROLLED TYPE 2 DIABETES MELLITUS (CMS/HCC): ICD-10-CM

## 2022-04-22 DIAGNOSIS — E78.5 HYPERLIPIDEMIA ASSOCIATED WITH TYPE 2 DIABETES MELLITUS (CMS/HCC): ICD-10-CM

## 2022-04-22 DIAGNOSIS — G25.0 ESSENTIAL TREMOR: ICD-10-CM

## 2022-04-22 PROCEDURE — 3074F SYST BP LT 130 MM HG: CPT | Performed by: FAMILY MEDICINE

## 2022-04-22 PROCEDURE — 3008F BODY MASS INDEX DOCD: CPT | Performed by: FAMILY MEDICINE

## 2022-04-22 PROCEDURE — 3078F DIAST BP <80 MM HG: CPT | Performed by: FAMILY MEDICINE

## 2022-04-22 PROCEDURE — 99397 PER PM REEVAL EST PAT 65+ YR: CPT | Performed by: FAMILY MEDICINE

## 2022-04-22 RX ORDER — METOPROLOL SUCCINATE 25 MG/1
25 TABLET, EXTENDED RELEASE ORAL DAILY
COMMUNITY
End: 2023-08-11 | Stop reason: SDUPTHER

## 2022-04-22 RX ORDER — CLOTRIMAZOLE 1 %
CREAM (GRAM) TOPICAL 2 TIMES DAILY
COMMUNITY
End: 2023-01-06

## 2022-04-22 RX ORDER — ELECTROLYTES/DEXTROSE
SOLUTION, ORAL ORAL DAILY
COMMUNITY
End: 2024-02-20 | Stop reason: ALTCHOICE

## 2022-04-22 ASSESSMENT — PAIN SCALES - GENERAL: PAINLEVEL: 0-NO PAIN

## 2022-04-22 ASSESSMENT — PATIENT HEALTH QUESTIONNAIRE - PHQ9: SUM OF ALL RESPONSES TO PHQ9 QUESTIONS 1 & 2: 0

## 2022-04-27 ENCOUNTER — OFFICE VISIT (OUTPATIENT)
Dept: FAMILY MEDICINE | Facility: CLINIC | Age: 67
End: 2022-04-27
Payer: COMMERCIAL

## 2022-04-27 VITALS
SYSTOLIC BLOOD PRESSURE: 138 MMHG | BODY MASS INDEX: 35.43 KG/M2 | WEIGHT: 239.2 LBS | RESPIRATION RATE: 16 BRPM | DIASTOLIC BLOOD PRESSURE: 80 MMHG | HEIGHT: 69 IN | OXYGEN SATURATION: 97 % | HEART RATE: 75 BPM | TEMPERATURE: 97.3 F

## 2022-04-27 DIAGNOSIS — E11.9 DIET-CONTROLLED TYPE 2 DIABETES MELLITUS (CMS/HCC): ICD-10-CM

## 2022-04-27 DIAGNOSIS — N30.00 ACUTE CYSTITIS WITHOUT HEMATURIA: Primary | ICD-10-CM

## 2022-04-27 LAB
BILIRUBIN, POC: NEGATIVE
BLOOD URINE, POC: PRESENT
CLARITY, POC: CLEAR
COLOR, POC: YELLOW
EXPIRATION DATE: NORMAL
GLUCOSE URINE, POC: NEGATIVE
KETONES, POC: NEGATIVE
LEUKOCYTE EST, POC: NORMAL
Lab: NORMAL
NITRITE, POC: NEGATIVE
PH, POC: 6
POCT MANUFACTURER: NORMAL
PROTEIN, POC: NORMAL
SPECIFIC GRAVITY, POC: 1.02
UROBILINOGEN, POC: 0.2

## 2022-04-27 PROCEDURE — 3079F DIAST BP 80-89 MM HG: CPT | Performed by: FAMILY MEDICINE

## 2022-04-27 PROCEDURE — 3075F SYST BP GE 130 - 139MM HG: CPT | Performed by: FAMILY MEDICINE

## 2022-04-27 PROCEDURE — 81002 URINALYSIS NONAUTO W/O SCOPE: CPT | Performed by: FAMILY MEDICINE

## 2022-04-27 PROCEDURE — 3008F BODY MASS INDEX DOCD: CPT | Performed by: FAMILY MEDICINE

## 2022-04-27 PROCEDURE — 99213 OFFICE O/P EST LOW 20 MIN: CPT | Performed by: FAMILY MEDICINE

## 2022-04-27 RX ORDER — NITROFURANTOIN 25; 75 MG/1; MG/1
100 CAPSULE ORAL 2 TIMES DAILY
Qty: 10 CAPSULE | Refills: 0 | Status: SHIPPED | OUTPATIENT
Start: 2022-04-27 | End: 2022-05-02

## 2022-04-27 ASSESSMENT — PAIN SCALES - GENERAL: PAINLEVEL: 0-NO PAIN

## 2022-04-27 NOTE — PROGRESS NOTES
Chief Complaint   Patient presents with   • UTI       Subjective   Alejandra Rocha is a 66 y.o. female who complains of urinary frequency, urgency and dysuria x 2 days, without flank pain, fever, chills, or abnormal vaginal discharge or bleeding.     Patient is not sexually active.     Objective   Vitals:    04/27/22 1111   BP: 138/80   Pulse: 75   Resp: 16   Temp: 36.3 °C (97.3 °F)   SpO2: 97%     Appears well, in no apparent distress.  The abdomen is soft without tenderness, guarding, mass, rebound or organomegaly. No CVA tenderness or inguinal adenopathy noted.     Urine dipstick shows  positive for RBC's and positive for leukocytes, and negative for all other components,.     Assessment/Plan    Diagnosis Plan   1. Acute cystitis without hematuria  POCT urinalysis dipstick    Urine culture    nitrofurantoin, macrocrystal-monohydrate, (MACROBID) 100 mg capsule    Urine culture   2. Diet-controlled type 2 diabetes mellitus (CMS/[facility])           UTI uncomplicated without evidence of pyelonephritis  We will start with Macrobid.  Of note patient had been on Augmentin earlier in the month, low threshold to place her on fluconazole 150 mg is not improving/If urine culture is negative    Treatment per orders - also push fluids, may use Pyridium OTC prn.     Return if symptoms worsen or fail to improve.

## 2022-04-30 LAB
BACTERIA UR CULT: ABNORMAL
BACTERIA UR CULT: ABNORMAL
OTHER ANTIBIOTIC SUSC ISLT: ABNORMAL

## 2022-09-20 NOTE — TELEPHONE ENCOUNTER
Patient states she did not inquire about any record releases for us a [facility]. She gave the ok to shred forms.    Pt sched an appt thru [facility] w/PCP on 10/10/2022 for a CPX and also states: Having chest pain on and off, been very exhausted,  no matter how much I sleep I wake up tried.  Please Advise if Nurse should Triage the Pt's Symptoms!  Pt can be reached at [phone].

## 2022-09-29 DIAGNOSIS — G25.0 ESSENTIAL TREMOR: ICD-10-CM

## 2022-09-29 RX ORDER — METOPROLOL SUCCINATE 100 MG/1
TABLET, EXTENDED RELEASE ORAL
Qty: 90 TABLET | Refills: 3 | Status: SHIPPED | OUTPATIENT
Start: 2022-09-29 | End: 2023-01-06 | Stop reason: SDUPTHER

## 2022-10-10 DIAGNOSIS — I15.2 HYPERTENSION ASSOCIATED WITH DIABETES (CMS/HCC): ICD-10-CM

## 2022-10-10 DIAGNOSIS — E11.59 HYPERTENSION ASSOCIATED WITH DIABETES (CMS/HCC): ICD-10-CM

## 2022-10-10 RX ORDER — LISINOPRIL AND HYDROCHLOROTHIAZIDE 10; 12.5 MG/1; MG/1
TABLET ORAL
Qty: 90 TABLET | Refills: 3 | Status: SHIPPED | OUTPATIENT
Start: 2022-10-10 | End: 2023-08-11 | Stop reason: SDUPTHER

## 2022-12-28 DIAGNOSIS — G25.0 TREMOR, ESSENTIAL: ICD-10-CM

## 2022-12-28 RX ORDER — METOPROLOL SUCCINATE 25 MG/1
TABLET, EXTENDED RELEASE ORAL
Qty: 90 TABLET | Refills: 2 | Status: SHIPPED | OUTPATIENT
Start: 2022-12-28

## 2023-01-04 LAB
CHOLEST SERPL-MCNC: 194 MG/DL (ref 100–199)
HBA1C MFR BLD: 5.9 % (ref 4.8–5.6)
HDLC SERPL-MCNC: 50 MG/DL
LDLC SERPL CALC-MCNC: 116 MG/DL (ref 0–99)
TRIGL SERPL-MCNC: 161 MG/DL (ref 0–149)
VLDLC SERPL CALC-MCNC: 28 MG/DL (ref 5–40)

## 2023-01-05 ASSESSMENT — ENCOUNTER SYMPTOMS
ARTHRALGIAS: 1
CONSTITUTIONAL NEGATIVE: 1
RESPIRATORY NEGATIVE: 1
ENDOCRINE NEGATIVE: 1
EYES NEGATIVE: 1
CARDIOVASCULAR NEGATIVE: 1
PSYCHIATRIC NEGATIVE: 1
TREMORS: 1
GASTROINTESTINAL NEGATIVE: 1
HEMATOLOGIC/LYMPHATIC NEGATIVE: 1

## 2023-01-06 ENCOUNTER — OFFICE VISIT (OUTPATIENT)
Dept: FAMILY MEDICINE | Facility: CLINIC | Age: 68
End: 2023-01-06
Payer: COMMERCIAL

## 2023-01-06 VITALS
BODY MASS INDEX: 34.7 KG/M2 | OXYGEN SATURATION: 96 % | RESPIRATION RATE: 16 BRPM | SYSTOLIC BLOOD PRESSURE: 130 MMHG | TEMPERATURE: 97.7 F | DIASTOLIC BLOOD PRESSURE: 80 MMHG | HEART RATE: 70 BPM | WEIGHT: 235 LBS

## 2023-01-06 DIAGNOSIS — I15.2 HYPERTENSION ASSOCIATED WITH DIABETES (CMS/HCC): ICD-10-CM

## 2023-01-06 DIAGNOSIS — E55.9 VITAMIN D DEFICIENCY: ICD-10-CM

## 2023-01-06 DIAGNOSIS — E11.9 DIET-CONTROLLED TYPE 2 DIABETES MELLITUS (CMS/HCC): Primary | ICD-10-CM

## 2023-01-06 DIAGNOSIS — E78.5 HYPERLIPIDEMIA ASSOCIATED WITH TYPE 2 DIABETES MELLITUS (CMS/HCC): ICD-10-CM

## 2023-01-06 DIAGNOSIS — G25.0 ESSENTIAL TREMOR: ICD-10-CM

## 2023-01-06 DIAGNOSIS — E11.69 HYPERLIPIDEMIA ASSOCIATED WITH TYPE 2 DIABETES MELLITUS (CMS/HCC): ICD-10-CM

## 2023-01-06 DIAGNOSIS — E11.59 HYPERTENSION ASSOCIATED WITH DIABETES (CMS/HCC): ICD-10-CM

## 2023-01-06 PROCEDURE — 99214 OFFICE O/P EST MOD 30 MIN: CPT | Performed by: FAMILY MEDICINE

## 2023-01-06 PROCEDURE — 3079F DIAST BP 80-89 MM HG: CPT | Performed by: FAMILY MEDICINE

## 2023-01-06 PROCEDURE — 3075F SYST BP GE 130 - 139MM HG: CPT | Performed by: FAMILY MEDICINE

## 2023-01-06 PROCEDURE — 3008F BODY MASS INDEX DOCD: CPT | Performed by: FAMILY MEDICINE

## 2023-01-06 RX ORDER — METOPROLOL SUCCINATE 100 MG/1
100 TABLET, EXTENDED RELEASE ORAL DAILY
Qty: 90 TABLET | Refills: 3 | Status: SHIPPED | OUTPATIENT
Start: 2023-01-06 | End: 2023-08-11 | Stop reason: SDUPTHER

## 2023-01-06 ASSESSMENT — PATIENT HEALTH QUESTIONNAIRE - PHQ9: SUM OF ALL RESPONSES TO PHQ9 QUESTIONS 1 & 2: 0

## 2023-01-06 NOTE — Clinical Note
Please send request for diabetic retinal exam to eye doctor, information listed in health maintenance

## 2023-01-06 NOTE — PROGRESS NOTES
CC:   Chief Complaint   Patient presents with   • Follow-up     6 mo f/u.  No concerns.      Subjective   Alejandra Rocha is a 67 y.o. female presenting for f/u  ?  Will discuss at appointment on 2023  Triglycerides are up and LDL did increase slightly from 1 year ago  A1c 5.9, decreased from 1 year ago    #Type 2 diabetes: Last hemoglobin A1c: 6.5 x2 in 2019  Current regimen: not on diabetic meds, controls with diet and weight loss.  Lab Results   Component Value Date    HGBA1C 5.9 (H) 2023    HGBA1C 6.2 (H) 2021   Does not check sugars at home  Diabetic foot exam: 10/22/2021- due  Diabetic eye exam: Up-to-date, need record  Last Microalbumin: 2021- due    #Essential tremor: Has been worsening over the last few years, saw neurology up in [facility], started on metoprolol  Increased to 125 mg daily    #HTN: current regimen: Currently on lisinopril-HCTZ 10-12.5 mg daily and metoprolol 125 mg daily as above  Denies any chest pain, shortness of breath, leg swelling  Patient does check blood pressures at home- <140/70s    #HDL: Cholesterol had increased, patient declined statin and wanted to do diet  The 10-year ASCVD risk score (Timur OROZCO, et al., 2019) is: 21.6%    Values used to calculate the score:      Age: 67 years      Sex: Female      Is Non- : No      Diabetic: Yes      Tobacco smoker: No      Systolic Blood Pressure: 130 mmHg      Is BP treated: Yes      HDL Cholesterol: 50 mg/dL      Total Cholesterol: 194 mg/dL    #Vitamin D deficiency Currently on 1000 IU daily  No results found for: VITD25    Work: Retired, previously a teacher of [email or web address]   Relationship: , lois , currently in 55+ community-- [employer]  Family: 4kids, 5 grandkids; 4 boys, 1 girl--- 13yo down to 5yo     LMP: No LMP recorded. Patient is postmenopausal.  OB history:   GYN history: Follows with gynecology    Last mammogram - 2022 and normal- Needs to tell us where  she had it done  Last DEXA scan 1/17/2019 And normal bone density, Repeat at age 68  Fit test 4/12/2022    Review of Systems   Review of Systems   Constitutional: Negative.    HENT: Negative.    Eyes: Negative.    Respiratory: Negative.    Cardiovascular: Negative.    Gastrointestinal: Negative.    Endocrine: Negative.    Genitourinary: Negative.    Musculoskeletal: Positive for arthralgias.   Skin: Negative.    Neurological: Positive for tremors.   Hematological: Negative.    Psychiatric/Behavioral: Negative.        Objective   Physical Exam:  Vitals:    01/06/23 1037   BP: 130/80   BP Location: Left upper arm   Patient Position: Sitting   Pulse: 70   Resp: 16   Temp: 36.5 °C (97.7 °F)   TempSrc: Temporal   SpO2: 96%   Weight: 107 kg (235 lb)     Physical Exam  Constitutional:       Appearance: She is well-developed.   HENT:      Head: Normocephalic and atraumatic.   Eyes:      Conjunctiva/sclera: Conjunctivae normal.   Cardiovascular:      Rate and Rhythm: Normal rate and regular rhythm.      Heart sounds: Normal heart sounds.   Pulmonary:      Effort: Pulmonary effort is normal.      Breath sounds: Normal breath sounds.   Musculoskeletal:         General: Normal range of motion.      Cervical back: Normal range of motion and neck supple.   Skin:     General: Skin is warm and dry.   Neurological:      Mental Status: She is alert and oriented to person, place, and time.      Motor: Tremor present.   Psychiatric:         Behavior: Behavior normal.         Thought Content: Thought content normal.         Judgment: Judgment normal.       Diabetic foot exam:  Right Foot: skin intact, neurovascularly intact, monofilament felt in All fields  Left Foot: skin intact, neurovascularly intact Monofilament felt in All fields      ?  Assessment/Plan   Alejandra Rocha is a 67 y.o. female presenting for   Chief Complaint   Patient presents with   • Follow-up     6 mo f/u.  No concerns.       Diagnosis Plan   1. Diet-controlled type  2 diabetes mellitus (CMS/Formerly [facility])  CBC and Differential    Comprehensive metabolic panel    Hemoglobin A1c    Lipid panel    Microalbumin/Creatinine Ur Random    DIABETIC FOOT EXAM    CBC and Differential    Comprehensive metabolic panel    Hemoglobin A1c    Lipid panel    Microalbumin/Creatinine Ur Random    Still well controlled, will plan To repeat blood work with urine in 6mo      2. Hypertension associated with diabetes (CMS/Formerly [facility])  Microalbumin/Creatinine Ur Random    metoprolol succinate XL (TOPROL-XL) 100 mg 24 hr tablet    Microalbumin/Creatinine Ur Random    Well-controlled on current regimen, initially Elevated      3. Hyperlipidemia associated with type 2 diabetes mellitus (CMS/Formerly [facility])  Lipid panel    Lipid panel    Patient is candidate for statin but declines medication, continue to monitor      4. Essential tremor  metoprolol succinate XL (TOPROL-XL) 100 mg 24 hr tablet    Following with neurology, I do suggest if symptoms are worsening to consider additional medication      5. Vitamin D deficiency  Vitamin D 25 hydroxy    Vitamin D 25 hydroxy        #Health Maintenance: Mammogram and DEXA up-to-date, fit test UTD  - Immunizations: Up-to-date     Return in about 6 months (around 7/6/2023) for Initial Medicare annual.

## 2023-01-07 LAB
BUN SERPL-MCNC: 20 MG/DL (ref 8–27)
BUN/CREAT SERPL: 23 (ref 12–28)
CALCIUM SERPL-MCNC: 10.1 MG/DL (ref 8.7–10.3)
CHLORIDE SERPL-SCNC: 102 MMOL/L (ref 96–106)
CO2 SERPL-SCNC: 22 MMOL/L (ref 20–29)
CREAT SERPL-MCNC: 0.87 MG/DL (ref 0.57–1)
EGFRCR SERPLBLD CKD-EPI 2021: 72 ML/MIN/1.73
GLUCOSE SERPL-MCNC: 114 MG/DL (ref 70–99)
POTASSIUM SERPL-SCNC: 4.4 MMOL/L (ref 3.5–5.2)
SODIUM SERPL-SCNC: 140 MMOL/L (ref 134–144)

## 2023-01-11 LAB — SPECIMEN STATUS: NORMAL

## 2023-04-06 ENCOUNTER — OFFICE VISIT (OUTPATIENT)
Dept: NEUROLOGY | Facility: CLINIC | Age: 68
End: 2023-04-06

## 2023-04-06 VITALS — SYSTOLIC BLOOD PRESSURE: 136 MMHG | DIASTOLIC BLOOD PRESSURE: 72 MMHG

## 2023-04-06 DIAGNOSIS — G25.0 TREMOR, ESSENTIAL: Primary | ICD-10-CM

## 2023-04-06 RX ORDER — PRIMIDONE 50 MG/1
50 TABLET ORAL
Qty: 90 TABLET | Refills: 1 | Status: SHIPPED | OUTPATIENT
Start: 2023-04-06

## 2023-04-06 NOTE — PATIENT INSTRUCTIONS
Progression of hand, jaw and vocal tremor  Will continue on Toprol XL  Will try adding primidone 50mg nightly  If you develop side effects such as increase sleepiness then reduce to 1/2 tab (25mg) for a week then try to increase again  If ineffective further increase in 25mg increments can be made separate at least by a week

## 2023-04-06 NOTE — PROGRESS NOTES
Patient ID: Zahra Hratley is a 69 y o  female    Assessment/Plan:    Tremor, essential  Progression of hand, jaw and vocal tremor  They have become more bothersome and she is interested in treatment options  We discussed adding a medication such as primidone or topiramate  Potential side effects and benefits reviewed  We opted to continue on Toprol XL and add primidone  Instructed to start primidone 50mg nightly  If she develops side effects such as increase sleepiness then she is to reduce to 1/2 tab (25mg) for a week then try to increase again  If ineffective further increase in 25mg increments can be made separate at least by a week  She will contact the office if having side effects or if ineffective  We briefly reviewed MRI focused ultrasound surgery and deep brains stimulation surgery  She is not interested in neurosurgical options at this point  She was encouraged to use adaptive equipment and strategies to control tremor  Diagnoses and all orders for this visit:    Tremor, essential  -     primidone (MYSOLINE) 50 mg tablet; Take 1 tablet (50 mg total) by mouth daily at bedtime          Subjective:      Zahra Hartley is a RH woman with familial essential tremor who presents for follow up  To review, first seen 10/27/14 with mild tremors  She was diagnosed in [place] by her PCP who noted a vocal tremor during a routine visit in August 2010  Tremor has gradually spread to her head and hands  Given progression she was started on Metoprolol with improvement  She reports over 20 years of tremor        She remains on Toprol Xl 100mg daily  Bilateral right greater than left action tremors can interfere eating and drinking at times  She has difficulty carrying cups of liquids due to spills  She can cut food  She was gifted a tremor sensing spoon but she has yet to try it  Vocal tremor is unchanged  Jaw tremor and head tremor are present and noted by others   She has been noting more jaw involvement  She denies any changes gait or balance or cognitve changes  She is interested medication option to better control tremor  Objective:    /72 (BP Location: Left arm, Patient Position: Sitting, Cuff Size: Large)       Physical Exam  Vitals reviewed  Eyes:      Extraocular Movements: Extraocular movements intact  Neurological:      Motor: Motor strength is normal    Psychiatric:         Speech: Speech normal          Neurological Exam  Mental Status   Oriented to person, place, time and situation  Recent and remote memory are intact  Speech is normal  Follows complex commands  Attention and concentration are normal     Cranial Nerves  CN III, IV, VI: Extraocular movements intact bilaterally  CN V: Facial sensation is normal   CN VII: Full and symmetric facial movement  CN VIII: Hearing is normal   CN IX, X: Palate elevates symmetrically  CN XI: Shoulder shrug strength is normal   CN XII: Tongue midline without atrophy or fasciculations  Motor   Normal muscle tone  Strength is 5/5 throughout all four extremities  Sensory  Light touch is normal in upper and lower extremities  Coordination  Right: Rapid alternating movement normal Left: Rapid alternating movement normal   Mild jaw tremor  Mild vocal tremor  Moderate amplitude postural and action tremor on FTN bilaterally  Mild, intermittent facial tremor  No clear head tremor       Gait  Casual gait is normal including stance, stride, and arm swing  Able to rise from chair without using arms           Current Outpatient Medications on File Prior to Visit   Medication Sig Dispense Refill   • cholecalciferol (VITAMIN D3) 1,000 units tablet Take 1,000 Units by mouth daily     • lisinopril-hydrochlorothiazide (PRINZIDE,ZESTORETIC) 10-12 5 MG per tablet Take 1 tablet by mouth daily     • metoprolol succinate (TOPROL-XL) 100 mg 24 hr tablet Take 1 tablet (100 mg total) by mouth daily 90 tablet 3   • metoprolol succinate (TOPROL-XL) 25 mg 24 hr tablet TAKE 1 TABLET BY MOUTH DAILY IN ADDITION TO 100MG TABLET (TOTAL 125MG DAILY) 90 tablet 2   • Multiple Vitamins-Minerals (MULTIVITAL PO) Take 1 capsule by mouth daily     • aspirin 81 MG tablet Take 81 mg by mouth daily (Patient not taking: Reported on 4/6/2022)     • hydrochlorothiazide (HYDRODIURIL) 12 5 mg tablet Take 1 tablet by mouth daily (Patient not taking: Reported on 4/6/2022)     • ketoconazole (NIZORAL) 2 % cream Use as directed (Patient not taking: Reported on 4/6/2022)       No current facility-administered medications on file prior to visit           Lazara Rueda MD  Movement disorder physician  [address] [place] [address]

## 2023-04-06 NOTE — PROGRESS NOTES
Review of Systems   Constitutional: Negative  Negative for appetite change and fever  HENT: Negative  Negative for hearing loss, tinnitus, trouble swallowing and voice change  Eyes: Negative  Negative for photophobia, pain and visual disturbance  Respiratory: Negative  Negative for shortness of breath  Cardiovascular: Negative  Negative for palpitations  Gastrointestinal: Negative  Negative for nausea and vomiting  Endocrine: Negative  Negative for cold intolerance  Genitourinary: Negative  Negative for dysuria, frequency and urgency  Musculoskeletal: Positive for arthralgias  Negative for gait problem, myalgias and neck pain  Skin: Negative  Negative for rash  Allergic/Immunologic: Negative  Neurological: Positive for tremors (Hands mostly, and at times notices in voice and teeth)  Negative for dizziness, seizures, syncope, facial asymmetry, speech difficulty, weakness, light-headedness, numbness and headaches  Hematological: Negative  Does not bruise/bleed easily  Psychiatric/Behavioral: Negative  Negative for confusion, hallucinations and sleep disturbance  All other systems reviewed and are negative

## 2023-04-06 NOTE — ASSESSMENT & PLAN NOTE
Progression of hand, jaw and vocal tremor  They have become more bothersome and she is interested in treatment options  We discussed adding a medication such as primidone or topiramate  Potential side effects and benefits reviewed  We opted to continue on Toprol XL and add primidone  Instructed to start primidone 50mg nightly  If she develops side effects such as increase sleepiness then she is to reduce to 1/2 tab (25mg) for a week then try to increase again  If ineffective further increase in 25mg increments can be made separate at least by a week  She will contact the office if having side effects or if ineffective  We briefly reviewed MRI focused ultrasound surgery and deep brains stimulation surgery  She is not interested in neurosurgical options at this point  She was encouraged to use adaptive equipment and strategies to control tremor

## 2023-04-29 ENCOUNTER — NURSE TRIAGE (OUTPATIENT)
Dept: FAMILY MEDICINE | Facility: CLINIC | Age: 68
End: 2023-04-29
Payer: COMMERCIAL

## 2023-04-29 RX ORDER — SULFAMETHOXAZOLE AND TRIMETHOPRIM 800; 160 MG/1; MG/1
1 TABLET ORAL 2 TIMES DAILY
Qty: 6 TABLET | Refills: 0 | Status: SHIPPED | OUTPATIENT
Start: 2023-04-29 | End: 2023-05-02

## 2023-04-29 NOTE — TELEPHONE ENCOUNTER
"  Patient calling PCP office today with UTI symptoms:    1. The patient does not have signs or symptoms of acute complicated UTI (acute UTI with symptoms suggestive of infection extending beyond bladder including, but not limited to: fever, chills, rigors, malaise, flank pain, CVA tenderness, pelvic pain).    2. The patient does not fall into a population with special considerations (pregnancy, renal transplant, male sex at birth).    3. The patient does not have risk factors for Multi-Drug Resistant gram-negative infection (history of any of the following in the last 3 months: MDR infection; inpatient stay in a health care facility; use of fluroquinolones, sulfamethoxasole/trimethoprim, or broad spectrum antibiotics; travel to region of the world with increased risk of MDR, including [place], [place], [place], and [place]).        I will empirically treat the patient for acute simple cystitis with Bactrim. Appropriate guidance provided for follow up - if symptoms worsen, symptoms change, or there is no improvement in symptoms with the use of antibiotics, the patient should contact their PCP’s office for further instructions.      Reason for Disposition  • Age > 50 years    Answer Assessment - Initial Assessment Questions  1. SEVERITY: \"How bad is the pain?\"  (e.g., Scale 1-10; mild, moderate, or severe)    - MILD (1-3): complains slightly about urination hurting    - MODERATE (4-7): interferes with normal activities      - SEVERE (8-10): excruciating, unwilling or unable to urinate because of the pain       mild  2. FREQUENCY: \"How many times have you had painful urination today?\"       A couple  3. PATTERN: \"Is pain present every time you urinate or just sometimes?\"       With urination  4. ONSET: \"When did the painful urination start?\"       yes  5. FEVER: \"Do you have a fever?\" If so, ask: \"What is your temperature, how was it measured, and when did it start?\"      no  6. PAST UTI: \"Have you had a urine infection " "before?\" If so, ask: \"When was the last time?\" and \"What happened that time?\"       yes  7. CAUSE: \"What do you think is causing the painful urination?\"  (e.g., UTI, scratch, Herpes sore)      Traveling from [place] and lack of fluid intake  8. OTHER SYMPTOMS: \"Do you have any other symptoms?\" (e.g., flank pain, vaginal discharge, genital sores, urgency, blood in urine)      frequency  9. PREGNANCY: \"Is there any chance you are pregnant?\" \"When was your last menstrual period?\"      na    Protocols used: URINATION PAIN - FEMALE-ADULT-AH    Synopsis:  Sent Bactrim to pharmacy. Will follow up if needed. Reviewed UTI protocol with patient and verbalized understanding.   Disposition:  See Physician Within 24 Hours  Care Advice:  Care Advice Given     Given By Given At Modified    Kaylene Cabrales CRNP 4/29/2023  9:16 AM No    SEE PHYSICIAN WITHIN 24 HOURS:   * IF OFFICE WILL BE OPEN: You need to be seen within the next 24 hours. Call your doctor when the office opens, and make an appointment.  * IF OFFICE WILL BE CLOSED AND NO PCP TRIAGE: You need to be seen within the next 24 hours. An urgent care center is often a good source of care if your doctor's office is closed.  * IF OFFICE WILL BE CLOSED AND PCP TRIAGE REQUIRED: You may need to be seen within the next 24 hours. Your doctor will want to talk with you to decide what's best. I'll page the doctor now. NOTE: Since this isn't serious, hold the page between from 10 pm to 7 am. Page the doctor in the morning.  * IF PATIENT HAS NO PCP: Refer patient to an Urgent Care Center or Retail Clinic. Also try to help caller find a PCP (medical home) for their future care.     Kaylene Cabrales CRNP 4/29/2023  9:16 AM No    REASSURANCE: This could be an urinary tract infection. You should see your PCP to be examined and tested.    Kaylene Cabrales CRNP 4/29/2023  9:16 AM No    FLUIDS: Drink extra fluids. Drink 8-10 glasses of liquids a day (Reason: to produce a dilute, " non-irritating urine).    Kaylene Cabrales CRNP 4/29/2023  9:16 AM No    CAUTION - FLUIDS:    * Increased fluid intake may be contraindicated in adults with renal failure or heart failure.    * You can discuss this with your doctor.    Kaylene Cabrales CRNP 4/29/2023  9:16 AM No    CRANBERRY JUICE:    * Some people think that drinking cranberry juice may help in fighting urinary tract infections. However, there is no good research that has ever proved this.   * Dosage Cranberry Juice Cocktail: 8 oz (240 ml) twice a day.   * Dosage 100% Cranberry Juice: 1 oz (30 ml) twice a day.    Kaylene Cabrales CRNP 4/29/2023  9:16 AM No    CAUTION - CRANBERRY JUICE:   * Do not drink more than 16 oz (480 ml) of cranberry juice cocktail per day (Reason: too much cranberry juice can also be irritating to the bladder).   * There have been a couple cases reported of interactions between cranberry juice and Coumadin (warfarin). In these cases the INR level increased for a period of days while the person was drinking cranberry juice. The INR is a test that is used to determine if a person is taking the right amount of Coumadin. At higher INR levels there is an increased risk of bleeding.   * Remember, antibiotics are needed to treat a urine infection!    Kaylene Cabrales CRNP 4/29/2023  9:16 AM No    CALL BACK IF:    * Fever or back pain occurs   * You become worse.    Kaylene Cabrales CRNP 4/29/2023  9:16 AM No    CARE ADVICE given per Urination Pain - Female (Adult) guideline.    Kaylene Cabrales CRNP 4/29/2023  9:16 AM No    WARM SALINE SITZ BATHS TO REDUCE PAIN: Sit in a warm saline bath for 20 minutes to cleanse the area and to reduce pain. Add 2 oz. of table salt or baking soda to a tub of water.       Patient/Caregiver understands and will follow care advice?  Yes, plans to follow advice        Orders Placed This Encounter:  Orders Placed This Encounter   • sulfamethoxazole-trimethoprim (BACTRIM DS) 800-160 mg per  tablet     Sig: Take 1 tablet by mouth 2 (two) times a day for 3 days.     Dispense:  6 tablet     Refill:  0

## 2023-05-31 ENCOUNTER — TELEPHONE (OUTPATIENT)
Dept: NEUROLOGY | Facility: CLINIC | Age: 68
End: 2023-05-31

## 2023-06-02 NOTE — TELEPHONE ENCOUNTER
Returned pt's call  Left detailed  message for pt to CB and leave a detailed message regarding the side effects from Primidone she is experiencing  (Communication Consent on file)

## 2023-06-28 ENCOUNTER — TELEPHONE (OUTPATIENT)
Dept: FAMILY MEDICINE | Facility: CLINIC | Age: 68
End: 2023-06-28

## 2023-06-28 NOTE — TELEPHONE ENCOUNTER
[facility] Appointment Request   Provider: Dr Swann   Appointment Type: IVETTE  Reason for Visit: Annual visit  Available Day and Time: Anytime After 7/17  Best Contact Number: cell ([phone])    The practice will reach out to schedule your appointment within the next 2 business days.

## 2023-06-28 NOTE — TELEPHONE ENCOUNTER
Called patient and LVM to contact the office to reschedule appointment. Advised next available is 08/07 @ 3:20

## 2023-08-07 LAB
BASOPHILS # BLD AUTO: 0.1 X10E3/UL (ref 0–0.2)
BASOPHILS NFR BLD AUTO: 1 %
EOSINOPHIL # BLD AUTO: 0.2 X10E3/UL (ref 0–0.4)
EOSINOPHIL NFR BLD AUTO: 3 %
ERYTHROCYTE [DISTWIDTH] IN BLOOD BY AUTOMATED COUNT: 13.1 % (ref 11.7–15.4)
HBA1C MFR BLD: 6.2 % (ref 4.8–5.6)
HCT VFR BLD AUTO: 37.5 % (ref 34–46.6)
HGB BLD-MCNC: 12.4 G/DL (ref 11.1–15.9)
IMM GRANULOCYTES # BLD AUTO: 0 X10E3/UL (ref 0–0.1)
IMM GRANULOCYTES NFR BLD AUTO: 0 %
LYMPHOCYTES # BLD AUTO: 2.7 X10E3/UL (ref 0.7–3.1)
LYMPHOCYTES NFR BLD AUTO: 40 %
MCH RBC QN AUTO: 29.7 PG (ref 26.6–33)
MCHC RBC AUTO-ENTMCNC: 33.1 G/DL (ref 31.5–35.7)
MCV RBC AUTO: 90 FL (ref 79–97)
MONOCYTES # BLD AUTO: 0.5 X10E3/UL (ref 0.1–0.9)
MONOCYTES NFR BLD AUTO: 8 %
NEUTROPHILS # BLD AUTO: 3.2 X10E3/UL (ref 1.4–7)
NEUTROPHILS NFR BLD AUTO: 48 %
PLATELET # BLD AUTO: 250 X10E3/UL (ref 150–450)
RBC # BLD AUTO: 4.17 X10E6/UL (ref 3.77–5.28)
WBC # BLD AUTO: 6.7 X10E3/UL (ref 3.4–10.8)

## 2023-08-08 LAB
25(OH)D3+25(OH)D2 SERPL-MCNC: 45.2 NG/ML (ref 30–100)
ALBUMIN SERPL-MCNC: 4.3 G/DL (ref 3.9–4.9)
ALBUMIN/CREAT UR: 18 MG/G CREAT (ref 0–29)
ALBUMIN/GLOB SERPL: 1.8 {RATIO} (ref 1.2–2.2)
ALP SERPL-CCNC: 87 IU/L (ref 44–121)
ALT SERPL-CCNC: 33 IU/L (ref 0–32)
AST SERPL-CCNC: 26 IU/L (ref 0–40)
BILIRUB SERPL-MCNC: <0.2 MG/DL (ref 0–1.2)
BUN SERPL-MCNC: 18 MG/DL (ref 8–27)
BUN/CREAT SERPL: 21 (ref 12–28)
CALCIUM SERPL-MCNC: 10.2 MG/DL (ref 8.7–10.3)
CHLORIDE SERPL-SCNC: 108 MMOL/L (ref 96–106)
CHOLEST SERPL-MCNC: 168 MG/DL (ref 100–199)
CO2 SERPL-SCNC: 19 MMOL/L (ref 20–29)
CREAT SERPL-MCNC: 0.86 MG/DL (ref 0.57–1)
CREAT UR-MCNC: 101.6 MG/DL
EGFRCR SERPLBLD CKD-EPI 2021: 73 ML/MIN/1.73
GLOBULIN SER CALC-MCNC: 2.4 G/DL (ref 1.5–4.5)
GLUCOSE SERPL-MCNC: 111 MG/DL (ref 70–99)
HDLC SERPL-MCNC: 48 MG/DL
LDLC SERPL CALC-MCNC: 94 MG/DL (ref 0–99)
MICROALBUMIN UR-MCNC: 18.4 UG/ML
POTASSIUM SERPL-SCNC: 4.3 MMOL/L (ref 3.5–5.2)
PROT SERPL-MCNC: 6.7 G/DL (ref 6–8.5)
SODIUM SERPL-SCNC: 143 MMOL/L (ref 134–144)
TRIGL SERPL-MCNC: 149 MG/DL (ref 0–149)
VLDLC SERPL CALC-MCNC: 26 MG/DL (ref 5–40)

## 2023-08-10 NOTE — PROGRESS NOTES
CC:   Chief Complaint   Patient presents with   • Welcome To Medicare     Pt presents for welcome to medicare. No concerns.      Subjective   Alejandra Rocha is a 68 y.o. female presenting for welcome to Medicare     Will discuss at appointment on 8/11/2023  CBC normal  Vitamin D good range  Cholesterol improved from 7 months ago with an LDL now at 94, closer to goal  A1c is controlled at 6.2 (increased from 7 months ago when it was 5.9)  micro albumin negative  CMP with elevated fasting sugar at 111 consistent with her A1c, chloride very slightly up and CO2 very slightly down, not clinically significant.  ALT is trending downwards and almost normal  ?    #Type 2 diabetes: Last hemoglobin A1c: 6.5 x2 in 2019  Current regimen: not on diabetic meds, controls with diet and weight loss.  Lab Results   Component Value Date    HGBA1C 6.2 (H) 08/07/2023    HGBA1C 5.9 (H) 01/03/2023    HGBA1C 6.2 (H) 11/05/2021   Does not check sugars at home  Diabetic foot exam: 1/6/2023  Diabetic eye exam: Up-to-date, 11/28/2022  Last Microalbumin: Normal    #Essential tremor: Has been worsening over the last few years, saw neurology up in [facility], started on metoprolol  Increased to 125 mg daily  She tried primidone but did not tolerate    #HTN: current regimen: Currently on lisinopril-HCTZ 10-12.5 mg daily and metoprolol 125 mg daily as above  Denies any chest pain, shortness of breath, leg swelling  Patient does check blood pressures at home- <140/70s     #HDL: Cholesterol had increased, patient declined statin and wanted to do diet  The 10-year ASCVD risk score (Timur OROZCO, et al., 2019) is: 23%    Values used to calculate the score:      Age: 68 years      Sex: Female      Is Non- : No      Diabetic: Yes      Tobacco smoker: No      Systolic Blood Pressure: 130 mmHg      Is BP treated: Yes      HDL Cholesterol: 48 mg/dL      Total Cholesterol: 168 mg/dL    #Vitamin D deficiency Currently on 1000 IU  daily      Diet: ok  Exercise: stopped playing pickle  Dental: sees dentist twice yearly  Eye exam: yearly    Work: Retired, previously a teacher of [employer]   Relationship: , lois , currently in 55+ community-- officially retired 2 months ago  Family: 4kids, 5 grandkids; 4 boys, 1 girl--- 15yo down to 5yo   ADLs without issues  Driving without issues   Falls: The patient does not have a history of falls. I did complete a risk assessment for falls. A plan of care for falls was documented.    LMP: No LMP recorded. Patient is postmenopausal.  OB history:   GYN history: Follows with gynecology    Last mammogram - 2023 and normal- Needs to tell us where she had it done  Last DEXA scan 2019 And normal bone density, Repeat at age 68  Fit test 2022       Patient Care Team:  Callie Belle MD as PCP - General (Family Medicine)  Dori Hernandez (Neurology)  [facility] (Ophthalmology)    Comprehensive Medical and Social History  Patient Active Problem List   Diagnosis   • Essential tremor   • Vitamin D deficiency   • Hypertension associated with diabetes (CMS/HCC)   • Hyperlipidemia associated with type 2 diabetes mellitus (CMS/HCC)   • Chronic pain of both knees   • Diet-controlled type 2 diabetes mellitus (CMS/HCC)     Past Medical History:   Diagnosis Date   • Arthritis    • Essential tremor    • Hypertension      History reviewed. No pertinent surgical history.  Allergies   Allergen Reactions   • Sodium,Potassium,Mag Sulfates      Reaction to red wine      Current Outpatient Medications   Medication Sig Dispense Refill   • cholecalciferol, vitamin D3, 1,000 unit (25 mcg) tablet Take 1,000 Units by mouth.     • lisinopriL-hydrochlorothiazide (PRINZIDE,ZESTORETIC) 10-12.5 mg per tablet Take 1 tablet by mouth daily. 90 tablet 3   • metoprolol succinate XL (TOPROL-XL) 100 mg 24 hr tablet Take 1 tablet (100 mg total) by mouth daily. Take with 25mg 90 tablet 3  "  • metoprolol succinate XL (TOPROL-XL) 25 mg 24 hr tablet Take 1 tablet (25 mg total) by mouth daily. With 100mg for total 125mg 90 tablet 3   • multivitamin tablet Take by mouth daily.     • biotin 5 mg capsule Take by mouth daily.       No current facility-administered medications for this visit.     Social History     Socioeconomic History   • Marital status:      Spouse name: None   • Number of children: None   • Years of education: None   • Highest education level: None   Tobacco Use   • Smoking status: Never     Passive exposure: Never   • Smokeless tobacco: Never   Substance and Sexual Activity   • Alcohol use: Yes     Comment: Socially   • Drug use: Never     Family History   Problem Relation Age of Onset   • Arthritis Biological Mother    • Breast cancer Biological Mother    • Melanoma Biological Mother    • ALS Biological Father    • Kidney disease Biological Daughter        Objective   Vitals  Vitals:    08/11/23 0924   BP: 130/78   BP Location: Left upper arm   Patient Position: Sitting   Pulse: 63   Resp: 16   Temp: 36.3 °C (97.4 °F)   TempSrc: Temporal   SpO2: 97%   Weight: 107 kg (235 lb 3.2 oz)   Height: 1.753 m (5' 9\")     Body mass index is 34.73 kg/m².     EKG with normal sinus rhythm at a rate of 63 bpm, repeat was 56, QTc 421, no concerning ST changes    Advanced Care Plan  Does patient have advance directive?: Yes           Does patient have current OOH DNR form?: Yes           Does patient have current POLST?: No             PHQ  Will the patient answer the depression questions?: Yes   Little interest or pleasure in doing things: Not at all   Feeling down, depressed, or hopeless: Not at all   Depression Risk: 0                                                 Mini Cog  Completed: Yes  Score: 5  Result: Negative      Get Up and Go  Result: Pass    STEADI Falls Risk  One or more falls in the last year: No           Has trouble stepping up onto a curb: No   Advised to use a cane or walker " to get around safely: No   Often has to rush to the toilet: No   Feels unsteady when walking: No   Has lost some feeling in feet: No   Often feels sad or depressed: No   Steadies self on furniture while walking at home: No   Takes medication that makes him/her feel lightheaded or more tired than usual: No   Worried about falling: No   Takes medicine to sleep or improve mood: No   Needs to push with hands when rising from a chair: No   Falls screen completed: Yes     Hearing and Vision Screening  No results found.  See HRA for relevant hearing screening response.    Assessment/Plan    Diagnosis Plan   1. Welcome to Medicare preventive visit  ECG 12 LEAD OFFICE PERFORMED    ECG 12 lead interpretation and report performed as a screening during the IPPE ()    Overall doing well, discussed health maintenance and chronic issues as below      2. Diet-controlled type 2 diabetes mellitus (CMS/[facility])  Comprehensive metabolic panel    Hemoglobin A1c    Comprehensive metabolic panel    Hemoglobin A1c    Well-controlled off medication, will continue to monitor      3. Hypertension associated with diabetes (CMS/[facility])  metoprolol succinate XL (TOPROL-XL) 100 mg 24 hr tablet    lisinopriL-hydrochlorothiazide (PRINZIDE,ZESTORETIC) 10-12.5 mg per tablet    metoprolol succinate XL (TOPROL-XL) 25 mg 24 hr tablet    Initially elevated but improved on repeat, will continue to monitor      4. Hyperlipidemia associated with type 2 diabetes mellitus (CMS/[facility])      Patient declines statin, she does have a high ASCVD risk but levels have improved somewhat, will continue to monitor      5. Essential tremor  metoprolol succinate XL (TOPROL-XL) 100 mg 24 hr tablet    metoprolol succinate XL (TOPROL-XL) 25 mg 24 hr tablet    Following with neurology, on metoprolol as above, will reach out to them if worsening, did not tolerate primidone      6. Vitamin D deficiency        7. Colon cancer screening  SCAN ONLY COLOGCOLIN      8. Encounter for  osteoporosis screening in asymptomatic postmenopausal patient  DEXA BONE DENSITY            See Patient Instructions (the written plan) which was given to the patient for PPPS and health risk factors with interventions.     Return in about 6 months (around 2/11/2024).

## 2023-08-11 ENCOUNTER — OFFICE VISIT (OUTPATIENT)
Dept: FAMILY MEDICINE | Facility: CLINIC | Age: 68
End: 2023-08-11
Payer: MEDICARE

## 2023-08-11 VITALS
DIASTOLIC BLOOD PRESSURE: 78 MMHG | TEMPERATURE: 97.4 F | WEIGHT: 235.2 LBS | HEART RATE: 63 BPM | HEIGHT: 69 IN | SYSTOLIC BLOOD PRESSURE: 130 MMHG | RESPIRATION RATE: 16 BRPM | OXYGEN SATURATION: 97 % | BODY MASS INDEX: 34.83 KG/M2

## 2023-08-11 DIAGNOSIS — Z78.0 ENCOUNTER FOR OSTEOPOROSIS SCREENING IN ASYMPTOMATIC POSTMENOPAUSAL PATIENT: ICD-10-CM

## 2023-08-11 DIAGNOSIS — Z12.11 COLON CANCER SCREENING: ICD-10-CM

## 2023-08-11 DIAGNOSIS — Z00.00 WELCOME TO MEDICARE PREVENTIVE VISIT: Primary | ICD-10-CM

## 2023-08-11 DIAGNOSIS — E11.9 DIET-CONTROLLED TYPE 2 DIABETES MELLITUS (CMS/HCC): ICD-10-CM

## 2023-08-11 DIAGNOSIS — E11.59 HYPERTENSION ASSOCIATED WITH DIABETES (CMS/HCC): ICD-10-CM

## 2023-08-11 DIAGNOSIS — Z13.820 ENCOUNTER FOR OSTEOPOROSIS SCREENING IN ASYMPTOMATIC POSTMENOPAUSAL PATIENT: ICD-10-CM

## 2023-08-11 DIAGNOSIS — I15.2 HYPERTENSION ASSOCIATED WITH DIABETES (CMS/HCC): ICD-10-CM

## 2023-08-11 DIAGNOSIS — E11.69 HYPERLIPIDEMIA ASSOCIATED WITH TYPE 2 DIABETES MELLITUS (CMS/HCC): ICD-10-CM

## 2023-08-11 DIAGNOSIS — E55.9 VITAMIN D DEFICIENCY: ICD-10-CM

## 2023-08-11 DIAGNOSIS — G25.0 ESSENTIAL TREMOR: ICD-10-CM

## 2023-08-11 DIAGNOSIS — E78.5 HYPERLIPIDEMIA ASSOCIATED WITH TYPE 2 DIABETES MELLITUS (CMS/HCC): ICD-10-CM

## 2023-08-11 PROCEDURE — G0402 INITIAL PREVENTIVE EXAM: HCPCS | Performed by: FAMILY MEDICINE

## 2023-08-11 PROCEDURE — G0403 EKG FOR INITIAL PREVENT EXAM: HCPCS | Performed by: FAMILY MEDICINE

## 2023-08-11 RX ORDER — LISINOPRIL AND HYDROCHLOROTHIAZIDE 10; 12.5 MG/1; MG/1
1 TABLET ORAL DAILY
Qty: 90 TABLET | Refills: 3 | Status: SHIPPED | OUTPATIENT
Start: 2023-08-11 | End: 2024-10-09

## 2023-08-11 RX ORDER — METOPROLOL SUCCINATE 100 MG/1
100 TABLET, EXTENDED RELEASE ORAL DAILY
Qty: 90 TABLET | Refills: 3 | Status: SHIPPED | OUTPATIENT
Start: 2023-08-11 | End: 2024-10-09

## 2023-08-11 RX ORDER — PRIMIDONE 50 MG/1
50 TABLET ORAL
COMMUNITY
Start: 2023-04-06 | End: 2023-08-11

## 2023-08-11 RX ORDER — METOPROLOL SUCCINATE 25 MG/1
25 TABLET, EXTENDED RELEASE ORAL DAILY
Qty: 90 TABLET | Refills: 3 | Status: SHIPPED | OUTPATIENT
Start: 2023-08-11 | End: 2024-10-10

## 2023-08-11 ASSESSMENT — MINI COG
COMPLETED: YES
TOTAL SCORE: 5

## 2023-08-11 ASSESSMENT — PATIENT HEALTH QUESTIONNAIRE - PHQ9: SUM OF ALL RESPONSES TO PHQ9 QUESTIONS 1 & 2: 0

## 2023-08-11 NOTE — PATIENT INSTRUCTIONS
Your Personalized Prevention Plan Services (PPPS)    Preventive Services Checklist (Assumes Average Risk Unless Otherwise Noted):    Health Maintenance Topics with due status: Overdue       Topic Date Due    Falls Risk Screening Never done    COVID-19 Vaccine 02/07/2023    Colorectal Cancer Screening 04/12/2023    Influenza Vaccine 08/01/2023     Health Maintenance Topics with due status: Postponed       Topic Postponed Until    DEXA Scan 01/17/2024 (Originally 1955)     Health Maintenance Topics with due status: Not Due       Topic Last Completion Date    DTaP, Tdap, and Td Vaccines 01/21/2016    Breast Cancer Screening 06/06/2022    Annual Dilated Retinal Exam 11/28/2022    Diabetic Foot Exam 01/06/2023    Depression Screening 01/06/2023    Diabetes Kidney Health Evaluation: eGFR 08/07/2023    Diabetes Kidney Health Evaluation:  uACR 08/07/2023    Hemoglobin A1C 08/07/2023    Medicare Annual Wellness Visit 08/11/2023     Health Maintenance Topics with due status: Completed       Topic Last Completion Date    Zoster Vaccine 05/15/2019    Pneumococcal (65 years and older) 11/08/2019    Hepatitis C Screening 11/05/2021     Health Maintenance Topics with due status: Aged Out       Topic Date Due    Meningococcal ACWY Aged Out    HIB Vaccines Aged Out    Hepatitis B Vaccines Aged Out    IPV Vaccines Aged Out    HPV Vaccines Aged Out       You May Be Eligible for These Additional Preventive Services   (Assumes Average Risk Unless Otherwise Noted)  Diabetes Screening Any 1 risk factor: hypertension, dyslipidemia, obesity, high glucose; or Any 2 risk factors: >=66yo, overweight, family history diabetes (covered every 6 months)   Hepatitis C Screening Any 1 risk factor: 1) blood transfusion before 1992,   2) current or past injection drug use (annually for high risk; if born between 9759[birth year]3, see above for status).   Vaccine: Hepatitis B As necessary if at-risk: hemophilia, ESRD, diabetes,  living with individual infected with hep B, healthcare worker with frequent contact with blood/bodily fluids (series covered once)   Sexually Transmitted Diseases (STDs) As necessary chlamydia, gonorrhea, syphilis, hepatitis B (covered annually)  HIV if any 1 risk factor present: 1) <14yo or >64yo and at increased risk or 2) 15-64yo and ask for it (covered annually)   Lung Cancer Screening Low dose chest CT if all three risk factors: 1) 50-78yo, 2) smoker or quit within last 15y, 3) >=20 pack years (covered annually).  No results found for this or any previous visit.       Cholesterol Screening Both risk factors: 1) >=21yo and 2)  increased risk coronary artery disease (covered every 5 years).     Breast Cancer Screening Covered once 35-40yo, annually >=41yo (if >=51yo, see above for status).     Glaucoma Screening Any 1 risk factor: 1) diabetes, 2) family history glaucoma, 3)  >=51yo, 4)  American >=64yo (covered annually)           Health Risk Factors with Personalized Education:  ----------------------------------------------------------------------------------------------------------------------  Controlling Your Blood Pressure  Maintain a normal weight (body mass index between 18.5 and 24.9).  Eat more fruit, vegetables and low-fat dairy.  Eat less saturated fat and total fat.  Lower your sodium (salt) intake.  Try to stay under 1500 mg per day, but if you cannot get your intake to be that low, at least lower it by 1000 mg.  Stay active.  Try to get at least 90 to 150 minutes of exercise per week.  Try brisk walking, swimming, bicycling or dancing.  Limit alcohol intake.  When you do consume alcohol, drink no more than 1 drink per day.  If you have been prescribed medication, take it regularly and exactly as prescribed.  Let your PCP know if you have any problems or questions about your medication.  Check your blood pressure at home or at the store.  Write down your readings and share  them with your PCP  ----------------------------------------------------------------------------------------------------------------------  Controlling Your Diabetes  Stress can raise your blood sugar.  Learn what causes your stress.  Learn to lower your stress by spending time with family and friends, exercising, deep breathing, trying meditation or yoga, enjoying hobbies and getting enough rest.  Let your PCP know if you’re having problems limiting your stress.  Maintain a healthy weight by balancing your diet and exercise.  Choose foods that are lower in calories, saturated fat, trans fat, sugar and salt.  Eat foods with more fiber, like whole grain cereals, bread, crackers, rice or pasta.  Choose to eat fruit, vegetables, and low-fat or fat-free/skim dairy.  Reduce the portion size of your meals.  Make half of your meal vegetables and fruit, and divide the other half between lean protein and whole grains.  Drink water instead of juice and regular soda.  Spend at least some time being active on most days of the week.  Work to increase your muscle strength at least twice per week.  Try things like light weights, stretch bands, yoga, heavy gardening (digging, planting with tools) or push-ups.  If you have been prescribed medication, take it regularly and exactly as prescribed.  Let your PCP know if you have any problems or questions about your medication.  If you have been asked to check your blood sugar, write down your readings and share them with your PCP  ----------------------------------------------------------------------------------------------------------------------  Controlling Your Cholesterol  Reduce the amount of saturated and trans fat in your diet.  Limit intake of red meat.  Consume only low-fat or non-fat/skim dairy.  Limit fried food.  Cook with vegetable oils.  Reduce your intake of sugary foods, sugary drinks and alcohol.  Eat a diet high in fruit, vegetables and whole grains.  Get protein from  fish, poultry and a small portion of nuts.  Stay active.  Try to get at least 90 to 150 minutes of exercise per week.  Try brisk walking, swimming, bicycling or dancing.  Maintain a healthy weight by balancing your diet and exercise.  If you have been prescribed medication, take it regularly and exactly as prescribed. Let your PCP know if you have any problems or questions about your medication.  It’s important to know your cholesterol numbers.  When recommended by your PCP, get the cholesterol blood test.  ----------------------------------------------------------------------------------------------------------------------  Maintaining Strong Bones  Try to get at least 90 to 150 minutes of weight-bearing exercise per week.  Ensure intake of at least 1200mg of calcium per day.  Eat foods high in calcium like milk and other dairy, green vegetables, fruit, canned fish with soft and edible bones, nuts, calcium-set tofu.  Some foods are calcium-fortified, like bread, cereal, fruit juices and mineral water.  Help your body make vitamin D by getting 10-15 minutes per day of sunlight.    Ensure intake of at least 600IU of vitamin D per day.  Eat foods high in vitamin D like oily fish (salmon, sardines, mackerel) and eggs.  Some foods are fortified with vitamin D, like dairy and cereals.  Avoid high amounts of caffeine and salt, since they can cause the body to loose calcium.  Limit alcohol intake, since it is associated with weaker bones and is associated with falls and fractures.  Limit intake of fizzy drinks.  ----------------------------------------------------------------------------------------------------------------------  Reducing Your Risk of Falls  Tell your PCP if any of your medications make you feel tired, dizzy, lightheaded or off-balance.  Maintain coordination, flexibility and balance by ensuring regular physical activity.  Limit alcohol intake to 1 drink per day.  Consider avoiding all alcohol  intake.  Ensure good vision.  Visit an ophthalmologist or optometrist regularly for vision screening or to make sure your glasses / contact lens prescription is correct.  If you need glasses or contacts, wear them.  When you get new glasses or contacts, take time to get used to them.  Do not wear sunglasses or tinted lenses when indoors.  Ensure good hearing.  Have your hearing checked if you are having trouble hearing, or family and friends think you cannot hear them.  If you need a hearing aid, be sure it fits well and wear it.  Get enough rest.  Ensure about 7-9 hours of sleep every day.  Get up slowly from your bed or chairs.  Do not start walking until you are sure you feel steady.  Wear non-skid, rubber-soled, low-heeled shoes.  Do not walk in socks, or in shoes and slippers with smooth soles.  If your PCP or therapist recommends using a cane or walker, use it regularly.  Make your home safer.  Increase lighting throughout the house, especially at the top and bottom of stairs.  Ensure lighting is easily turned on when getting up in the middle of the night.  Make sure there are two secure rails on all stairs.  Install grab bars in the bathtub / shower and near the toilet.  Consider using a shower chair and / or a hand-held shower.  Spread sand or salt on icy surfaces.  Beware of wet surfaces, which can be icy.  Tell your PCP if you have fallen.

## 2023-09-07 LAB — COLOGUARD: NEGATIVE

## 2023-10-02 DIAGNOSIS — G25.0 TREMOR, ESSENTIAL: ICD-10-CM

## 2023-10-02 RX ORDER — PRIMIDONE 50 MG/1
50 TABLET ORAL
Qty: 90 TABLET | Refills: 1 | Status: SHIPPED | OUTPATIENT
Start: 2023-10-02

## 2023-10-17 ENCOUNTER — TELEMEDICINE (OUTPATIENT)
Dept: NEUROLOGY | Facility: CLINIC | Age: 68
End: 2023-10-17
Payer: MEDICARE

## 2023-10-17 DIAGNOSIS — G25.0 TREMOR, ESSENTIAL: Primary | ICD-10-CM

## 2023-10-17 PROCEDURE — 99213 OFFICE O/P EST LOW 20 MIN: CPT | Performed by: PSYCHIATRY & NEUROLOGY

## 2023-10-17 NOTE — ASSESSMENT & PLAN NOTE
Hand, jaw and vocal tremor present for years consistent with her diagnosis of essential tremor. Tremor likely partially controlled by metoprolol XL. Previous trial of primidone 50 mg 1 tablet for 1 day cause nausea and fog. She therefore discontinued and did not retrial.     She has learned to adapt to tremor over the years. Tremor is not impacting daily function. Most noticeable and bothersome when in a social situation. We discuss medication treatment options including retrying primidone 50 mg at half a tablet nightly for at least a week to see if it is tolerated. If she develops significant symptoms prior to weeks she will discontinue and contact the office. We will then try topiramate 25 mg daily with plans for further increase. Potential side effects such as cognitive fog, weight loss, and paresthesia were reviewed.

## 2023-10-17 NOTE — PATIENT INSTRUCTIONS
Hand, vocal, head tremor consistent with essential tremor. We discussed the treatment of tremor with medication such as primidone and topiramate. Treatment is symptomatic. We reviewed potential benefits of treating tremor versus just continue to adapt to living with tremo    After discussion we opted to retry primidone. We will retry primidone to 50 mg half a tablet nightly for 7 days with plans to increase to a full tablet if tolerated. If she develops significant issues tolerating medication can discontinue the medication sooner and contact the office. We will then start topiramate 25 mg daily with plans to further increase by 25 mg week needed. Potential side effects of topiramate such as cognitive fog, paresthesias, and weight loss were reviewed.

## 2023-10-17 NOTE — PROGRESS NOTES
Virtual Regular Visit    Verification of patient location:    Patient is located at Home in the following state in which I hold an active license [place]      Assessment/Plan:    Problem List Items Addressed This Visit          Nervous and Auditory    Tremor, essential - Primary     Hand, jaw and vocal tremor present for years consistent with her diagnosis of essential tremor. Tremor likely partially controlled by metoprolol XL. Previous trial of primidone 50 mg 1 tablet for 1 day cause nausea and fog. She therefore discontinued and did not retrial.     She has learned to adapt to tremor over the years. Tremor is not impacting daily function. Most noticeable and bothersome when in a social situation. We discuss medication treatment options including retrying primidone 50 mg at half a tablet nightly for at least a week to see if it is tolerated. If she develops significant symptoms prior to weeks she will discontinue and contact the office. We will then try topiramate 25 mg daily with plans for further increase. Potential side effects such as cognitive fog, weight loss, and paresthesia were reviewed. Reason for visit is tremor  Chief Complaint   Patient presents with    Virtual Regular Visit          Encounter provider Skye Fisher MD    Provider located at [address] [place] [address] [address]  [facility] [address]  [facility] [id number] [phone]      Recent Visits  No visits were found meeting these conditions. Showing recent visits within past 7 days and meeting all other requirements  Today's Visits  Date Type Provider Dept   10/17/23 Telemedicine Skye Fisher MD  Neuro [facility]   Showing today's visits and meeting all other requirements  Future Appointments  No visits were found meeting these conditions.   Showing future appointments within next 150 days and meeting all other requirements       The patient was identified by name and date of birth. Torsten Flores was informed that this is a telemedicine visit and that the visit is being conducted through the [employer]. She agrees to proceed. .  My office door was closed. No one else was in the room. She acknowledged consent and understanding of privacy and security of the video platform. The patient has agreed to participate and understands they can discontinue the visit at any time. Patient is aware this is a billable service. Subjective  Torsten Flores is a 77 y.o. 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 To review, first seen 10/27/14 with mild tremors. She was diagnosed in [place] by her PCP who noted a vocal tremor during a routine visit in August 2010. Tremor has gradually spread to her head and hands. Given progression she was started on Metoprolol with improvement. She reports over 20 years of tremor. She remains on metoprolol XL 125mg daily. She was unable to tolerate adding primidone. Bilateral right greater than left action tremors can interfere eating and drinking at times. She has difficulty carrying cups of liquids due to spills. She can cut food. She was gifted a tremor sensing spoon but she has yet to try it. Vocal tremor is unchanged. Jaw tremor and head tremor are present and noted by others. She has been noting more jaw involvement. She denies any changes gait or balance or cognitve changes. She is interested medication option to better control tremor.           Past Medical History:   Diagnosis Date    Arthritis        Past Surgical History:   Procedure Laterality Date    TUBAL LIGATION N/A 1988       Current Outpatient Medications   Medication Sig Dispense Refill    cholecalciferol (VITAMIN D3) 1,000 units tablet Take 1,000 Units by mouth daily      hydrochlorothiazide (HYDRODIURIL) 12.5 mg tablet Take 1 tablet by mouth daily      lisinopril-hydrochlorothiazide (PRINZIDE,ZESTORETIC) 10-12.5 MG per tablet Take 1 tablet by mouth daily      metoprolol succinate (TOPROL-XL) 100 mg 24 hr tablet Take 1 tablet (100 mg total) by mouth daily 90 tablet 3    metoprolol succinate (TOPROL-XL) 25 mg 24 hr tablet TAKE 1 TABLET BY MOUTH DAILY IN ADDITION TO 100MG TABLET (TOTAL 125MG DAILY) 90 tablet 2    Multiple Vitamins-Minerals (MULTIVITAL PO) Take 1 capsule by mouth daily      primidone (MYSOLINE) 50 mg tablet TAKE 1 TABLET BY MOUTH DAILY AT BEDTIME (Patient not taking: Reported on 10/17/2023) 90 tablet 1     No current facility-administered medications for this visit. Allergies   Allergen Reactions    Other Other (See Comments)     Reaction to red wine , Sodium,potassium,mag Sulfates       Review of Systems    Video Exam    There were no vitals filed for this visit. Physical Exam   Mental status: Patient is awake, alert and oriented x 4, Follows commands. Normal speech with mild focal tremor. Cranial Nerves:   CN III-VI- extra ocular muscles intact by independent movement  CN VII- symmetric facial movements  CN VIII- normal to voice  Coordination: Moderate amplitude postural tremors of both hands while outstretched. Moderate amplitude intentional tremor bilaterally. Visit Time  Total Visit Duration: 15minutes.  Additional 8 min on review and documentation

## 2023-10-17 NOTE — PROGRESS NOTES
Review of Systems   Constitutional:  Negative for appetite change, fatigue and fever. HENT: Negative. Negative for hearing loss, tinnitus, trouble swallowing and voice change. Eyes: Negative. Negative for photophobia, pain and visual disturbance. Respiratory: Negative. Negative for shortness of breath. Cardiovascular: Negative. Negative for palpitations. Gastrointestinal: Negative. Negative for nausea and vomiting. Endocrine: Negative. Negative for cold intolerance. Genitourinary: Negative. Negative for dysuria, frequency and urgency. Musculoskeletal:  Negative for back pain, gait problem, myalgias and neck pain. Skin: Negative. Negative for rash. Allergic/Immunologic: Negative. Neurological:  Positive for tremors (continue the same as loast visit.). Negative for dizziness, seizures, syncope, facial asymmetry, speech difficulty, weakness, light-headedness, numbness and headaches. Could not tolerate Primidone    Hematological: Negative. Does not bruise/bleed easily. Psychiatric/Behavioral: Negative. Negative for confusion, hallucinations and sleep disturbance. All other systems reviewed and are negative.

## 2023-12-29 ENCOUNTER — OFFICE VISIT (OUTPATIENT)
Dept: FAMILY MEDICINE | Facility: CLINIC | Age: 68
End: 2023-12-29
Payer: MEDICARE

## 2023-12-29 ENCOUNTER — TELEPHONE (OUTPATIENT)
Dept: FAMILY MEDICINE | Facility: CLINIC | Age: 68
End: 2023-12-29
Payer: MEDICARE

## 2023-12-29 VITALS
HEIGHT: 69 IN | TEMPERATURE: 97.9 F | RESPIRATION RATE: 16 BRPM | WEIGHT: 236 LBS | HEART RATE: 71 BPM | OXYGEN SATURATION: 97 % | SYSTOLIC BLOOD PRESSURE: 124 MMHG | DIASTOLIC BLOOD PRESSURE: 82 MMHG | BODY MASS INDEX: 34.96 KG/M2

## 2023-12-29 DIAGNOSIS — N30.01 ACUTE CYSTITIS WITH HEMATURIA: Primary | ICD-10-CM

## 2023-12-29 LAB
BACTERIA, POC: POSITIVE
BILIRUBIN, POC: NORMAL
BLOOD URINE, POC: POSITIVE
CLARITY, POC: NORMAL
COLOR, POC: YELLOW
EXPIRATION DATE: NORMAL
GLUCOSE URINE, POC: NEGATIVE
KETONES, POC: NEGATIVE
LEUKOCYTE EST, POC: NORMAL
Lab: NORMAL
NITRITE, POC: NEGATIVE
PH, POC: 5.5
POCT MANUFACTURER: NORMAL
PROTEIN, POC: NORMAL
SPECIFIC GRAVITY, POC: 1.01
UROBILINOGEN, POC: 0.2

## 2023-12-29 PROCEDURE — G8754 DIAS BP LESS 90: HCPCS | Performed by: NURSE PRACTITIONER

## 2023-12-29 PROCEDURE — 81002 URINALYSIS NONAUTO W/O SCOPE: CPT | Performed by: NURSE PRACTITIONER

## 2023-12-29 PROCEDURE — 99213 OFFICE O/P EST LOW 20 MIN: CPT | Performed by: NURSE PRACTITIONER

## 2023-12-29 PROCEDURE — G8752 SYS BP LESS 140: HCPCS | Performed by: NURSE PRACTITIONER

## 2023-12-29 RX ORDER — CEPHALEXIN 500 MG/1
500 CAPSULE ORAL 2 TIMES DAILY
Qty: 14 CAPSULE | Refills: 0 | Status: SHIPPED | OUTPATIENT
Start: 2023-12-29 | End: 2024-01-05

## 2023-12-29 ASSESSMENT — ENCOUNTER SYMPTOMS
FATIGUE: 1
DIFFICULTY URINATING: 1
HEMATURIA: 1

## 2023-12-29 NOTE — ASSESSMENT & PLAN NOTE
Dipstick +++leuks +++blood, elevated SpGr, small amt protein.  Sent keflex x 7 days, along with culture.  Continue pushing clears.  Call if any concerns.   Patient notified via [employer].

## 2023-12-29 NOTE — PROGRESS NOTES
"   [facility]  [address] [place] [address] [phone]          Alejandra Rocha is a 68 y.o. female who presents with UTI  Chief Complaint   Patient presents with   • UTI     Symptoms started two nights ago. She has been staying hydrated with water and cranberry juice. She doesn't have burning or itching, she can just feel that something is off.          3 days of UTI sxs  States \"something feels off but not burning\" with urination  Felt some relief with increased water and cranberry juice  Saw gross hematuria this morning      Medical History:  Past Medical History:   Diagnosis Date   • Arthritis    • Essential tremor    • Hypertension        Surgical History:  History reviewed. No pertinent surgical history.    Social History:  Social History     Social History Narrative   • Not on file       Family History:  Family History   Problem Relation Age of Onset   • Arthritis Biological Mother    • Breast cancer Biological Mother    • Melanoma Biological Mother    • ALS Biological Father    • Kidney disease Biological Daughter        Allergies:  Sodium,potassium,mag sulfates    Current Medications:  Current Outpatient Medications   Medication Sig Dispense Refill   • biotin 5 mg capsule Take by mouth daily.     • cephalexin (KEFLEX) 500 mg capsule Take 1 capsule (500 mg total) by mouth 2 (two) times a day for 7 days. 14 capsule 0   • cholecalciferol, vitamin D3, 1,000 unit (25 mcg) tablet Take 1,000 Units by mouth.     • lisinopriL-hydrochlorothiazide (PRINZIDE,ZESTORETIC) 10-12.5 mg per tablet Take 1 tablet by mouth daily. 90 tablet 3   • metoprolol succinate XL (TOPROL-XL) 100 mg 24 hr tablet Take 1 tablet (100 mg total) by mouth daily. Take with 25mg 90 tablet 3   • metoprolol succinate XL (TOPROL-XL) 25 mg 24 hr tablet Take 1 tablet (25 mg total) by mouth daily. With 100mg for total 125mg 90 tablet 3   • multivitamin tablet Take by mouth daily.       No current facility-administered " "medications for this visit.       Review of Systems:  Review of Systems   Constitutional: Positive for fatigue.   Genitourinary: Positive for difficulty urinating and hematuria.   All other systems reviewed and are negative.      Objective     Vital Signs:  Vitals:    12/29/23 1155   BP: 124/82   BP Location: Right upper arm   Patient Position: Sitting   Pulse: 71   Resp: 16   Temp: 36.6 °C (97.9 °F)   TempSrc: Temporal   SpO2: 97%   Weight: 107 kg (236 lb)   Height: 1.753 m (5' 9\")       BMI:  Body mass index is 34.85 kg/m².     Physical Exam  Vitals reviewed.   Constitutional:       Appearance: Normal appearance.   HENT:      Head: Normocephalic.   Eyes:      Extraocular Movements: Extraocular movements intact.   Cardiovascular:      Rate and Rhythm: Normal rate and regular rhythm.   Pulmonary:      Effort: Pulmonary effort is normal.      Breath sounds: Normal breath sounds.   Abdominal:      General: Abdomen is flat.      Palpations: Abdomen is soft.      Tenderness: There is no right CVA tenderness or left CVA tenderness.   Skin:     General: Skin is warm and dry.   Neurological:      Mental Status: She is alert and oriented to person, place, and time.   Psychiatric:         Behavior: Behavior normal.          Procedures   Assessment     There are no Patient Instructions on file for this visit.  Problem List Items Addressed This Visit        Genitourinary    Acute cystitis with hematuria - Primary     Dipstick +++leuks +++blood, elevated SpGr, small amt protein.  Sent keflex x 7 days, along with culture.  Continue pushing clears.  Call if any concerns.         Relevant Orders    Urine culture    POCT urinalysis dipstick (Completed)          The total time spent ON THE DAY OF THE VISIT was 20 minutes, including preparing to see the patient, obtaining and reviewing separately obtained history, performing medically appropriate examination or evaluation, counseling and educating patient/family/caregiver, ordering " medications, tests or procedures, referring and communicating with other health care professionals, documenting clinical information in electronic or other record, independently interpreting and communicating results to patient/family/caregiver, and/or care coordination.           CHRISTAL Lou  12/29/2023

## 2023-12-29 NOTE — TELEPHONE ENCOUNTER
sds scheduled.  Advised to arrive a few minutes early since we will need her to leave a urine specimen. Pt verbalized understanding.

## 2023-12-29 NOTE — TELEPHONE ENCOUNTER
Patient called stating she believes she has a UTI. C/o burning and a little bit of blood in urine.     Patient can be reached at [phone].

## 2023-12-30 LAB
BACTERIA UR CULT: NORMAL
BACTERIA UR CULT: NORMAL

## 2024-01-23 ENCOUNTER — TELEPHONE (OUTPATIENT)
Dept: FAMILY MEDICINE | Facility: CLINIC | Age: 69
End: 2024-01-23
Payer: MEDICARE

## 2024-01-23 DIAGNOSIS — R35.0 FREQUENCY OF MICTURITION: Primary | ICD-10-CM

## 2024-01-26 LAB
APPEARANCE UR: CLEAR
BACTERIA #/AREA URNS HPF: NORMAL /[HPF]
BILIRUB UR QL STRIP: NEGATIVE
CASTS URNS QL MICRO: NORMAL /LPF
COLOR UR: YELLOW
EPI CELLS #/AREA URNS HPF: NORMAL /HPF (ref 0–10)
GLUCOSE UR QL STRIP: NEGATIVE
HGB UR QL STRIP: NEGATIVE
KETONES UR QL STRIP: NEGATIVE
LAB CORP URINALYSIS REFLEX: NORMAL
LEUKOCYTE ESTERASE UR QL STRIP: NEGATIVE
MICRO URNS: NORMAL
MICRO URNS: NORMAL
NITRITE UR QL STRIP: NEGATIVE
PH UR STRIP: 6 [PH] (ref 5–7.5)
PROT UR QL STRIP: NEGATIVE
RBC #/AREA URNS HPF: NORMAL /HPF (ref 0–2)
SP GR UR STRIP: 1.01 (ref 1–1.03)
UROBILINOGEN UR STRIP-MCNC: 0.2 MG/DL (ref 0.2–1)
WBC #/AREA URNS HPF: NORMAL /HPF (ref 0–5)

## 2024-02-16 LAB — HBA1C MFR BLD: 6.5 % (ref 4.8–5.6)

## 2024-02-17 LAB
ALBUMIN SERPL-MCNC: 4.5 G/DL (ref 3.9–4.9)
ALBUMIN/GLOB SERPL: 1.7 {RATIO} (ref 1.2–2.2)
ALP SERPL-CCNC: 86 IU/L (ref 44–121)
ALT SERPL-CCNC: 55 IU/L (ref 0–32)
AST SERPL-CCNC: 43 IU/L (ref 0–40)
BILIRUB SERPL-MCNC: 0.3 MG/DL (ref 0–1.2)
BUN SERPL-MCNC: 19 MG/DL (ref 8–27)
BUN/CREAT SERPL: 24 (ref 12–28)
CALCIUM SERPL-MCNC: 10.4 MG/DL (ref 8.7–10.3)
CHLORIDE SERPL-SCNC: 101 MMOL/L (ref 96–106)
CO2 SERPL-SCNC: 25 MMOL/L (ref 20–29)
CREAT SERPL-MCNC: 0.8 MG/DL (ref 0.57–1)
EGFRCR SERPLBLD CKD-EPI 2021: 79 ML/MIN/1.73
GLOBULIN SER CALC-MCNC: 2.7 G/DL (ref 1.5–4.5)
GLUCOSE SERPL-MCNC: 106 MG/DL (ref 70–99)
POTASSIUM SERPL-SCNC: 4.7 MMOL/L (ref 3.5–5.2)
PROT SERPL-MCNC: 7.2 G/DL (ref 6–8.5)
SODIUM SERPL-SCNC: 139 MMOL/L (ref 134–144)

## 2024-02-20 ENCOUNTER — OFFICE VISIT (OUTPATIENT)
Dept: FAMILY MEDICINE | Facility: CLINIC | Age: 69
End: 2024-02-20
Payer: MEDICARE

## 2024-02-20 VITALS
SYSTOLIC BLOOD PRESSURE: 132 MMHG | DIASTOLIC BLOOD PRESSURE: 80 MMHG | OXYGEN SATURATION: 97 % | WEIGHT: 237 LBS | HEART RATE: 63 BPM | HEIGHT: 69 IN | BODY MASS INDEX: 35.1 KG/M2 | TEMPERATURE: 97.4 F

## 2024-02-20 DIAGNOSIS — E11.69 HYPERLIPIDEMIA ASSOCIATED WITH TYPE 2 DIABETES MELLITUS (CMS/HCC): ICD-10-CM

## 2024-02-20 DIAGNOSIS — M25.561 CHRONIC PAIN OF BOTH KNEES: ICD-10-CM

## 2024-02-20 DIAGNOSIS — G25.0 ESSENTIAL TREMOR: ICD-10-CM

## 2024-02-20 DIAGNOSIS — E83.52 HYPERCALCEMIA: ICD-10-CM

## 2024-02-20 DIAGNOSIS — G89.29 CHRONIC PAIN OF BOTH KNEES: ICD-10-CM

## 2024-02-20 DIAGNOSIS — M25.562 CHRONIC PAIN OF BOTH KNEES: ICD-10-CM

## 2024-02-20 DIAGNOSIS — E55.9 VITAMIN D DEFICIENCY: ICD-10-CM

## 2024-02-20 DIAGNOSIS — E78.5 HYPERLIPIDEMIA ASSOCIATED WITH TYPE 2 DIABETES MELLITUS (CMS/HCC): ICD-10-CM

## 2024-02-20 DIAGNOSIS — E11.59 HYPERTENSION ASSOCIATED WITH DIABETES (CMS/HCC): ICD-10-CM

## 2024-02-20 DIAGNOSIS — E11.9 DIET-CONTROLLED TYPE 2 DIABETES MELLITUS (CMS/HCC): Primary | ICD-10-CM

## 2024-02-20 DIAGNOSIS — R74.01 TRANSAMINITIS: ICD-10-CM

## 2024-02-20 DIAGNOSIS — I15.2 HYPERTENSION ASSOCIATED WITH DIABETES (CMS/HCC): ICD-10-CM

## 2024-02-20 PROBLEM — N30.01 ACUTE CYSTITIS WITH HEMATURIA: Status: RESOLVED | Noted: 2023-12-29 | Resolved: 2024-02-20

## 2024-02-20 PROCEDURE — 99214 OFFICE O/P EST MOD 30 MIN: CPT | Performed by: FAMILY MEDICINE

## 2024-02-20 PROCEDURE — G8754 DIAS BP LESS 90: HCPCS | Performed by: FAMILY MEDICINE

## 2024-02-20 PROCEDURE — G8752 SYS BP LESS 140: HCPCS | Performed by: FAMILY MEDICINE

## 2024-02-20 PROCEDURE — G2211 COMPLEX E/M VISIT ADD ON: HCPCS | Performed by: FAMILY MEDICINE

## 2024-02-20 RX ORDER — PRIMIDONE 50 MG/1
25 TABLET ORAL NIGHTLY
COMMUNITY

## 2024-02-20 ASSESSMENT — ENCOUNTER SYMPTOMS
EYES NEGATIVE: 1
ARTHRALGIAS: 1
ENDOCRINE NEGATIVE: 1
HEMATOLOGIC/LYMPHATIC NEGATIVE: 1
PSYCHIATRIC NEGATIVE: 1
CONSTITUTIONAL NEGATIVE: 1
CARDIOVASCULAR NEGATIVE: 1
RESPIRATORY NEGATIVE: 1
GASTROINTESTINAL NEGATIVE: 1
TREMORS: 1

## 2024-02-20 NOTE — PROGRESS NOTES
CC:   Chief Complaint   Patient presents with   • Follow-up     Subjective   Alejandra Rocha is a 68 y.o. female presenting for f/u    Will discuss at appointment on 2/20/2024  CMP with very slightly elevated calcium at 10.4, will make sure if she is taking supplement to hold off on it and slightly elevated glucose at 106  In addition she now has transaminitis with an AST/ALT of 43/55, increased from 6 months ago--no prior imaging of liver but was treated recently for UTI     A1c is still technically controlled at 6.5 but the highest it has been in the last 2 years, not currently on medication?    #Type 2 diabetes: Last hemoglobin A1c: 6.5 x2 in 2019  Current regimen: not on diabetic meds, controls with diet and weight loss.  Lab Results   Component Value Date    HGBA1C 6.5 (H) 02/16/2024    HGBA1C 6.2 (H) 08/07/2023    HGBA1C 5.9 (H) 01/03/2023    HGBA1C 6.2 (H) 11/05/2021   Does not check sugars at home  Diabetic foot exam: due  Diabetic eye exam: Up-to-date, need records  Last Microalbumin: Normal        2/20/2024     2:45 PM 12/29/2023    11:55 AM 8/11/2023     9:24 AM 1/6/2023    10:37 AM 4/27/2022    11:11 AM   Weight Trends   Weight (lb) 237 lb 236 lb 235 lb 3.2 oz 235 lb 239 lb 3.2 oz   Weight (kg) 107.502 kg 107.049 kg 106.686 kg 106.595 kg 108.5 kg         #HTN: current regimen: Currently on lisinopril-HCTZ 10-12.5 mg daily and metoprolol 125 mg daily as above   Denies any chest pain, shortness of breath, leg swelling  Patient does check blood pressures at home- <140/70s     #HDL: Cholesterol had increased, patient declined statin and wanted to do diet  The 10-year ASCVD risk score (Timur OROZCO, et al., 2019) is: 23.7%    Values used to calculate the score:      Age: 68 years      Sex: Female      Is Non- : No      Diabetic: Yes      Tobacco smoker: No      Systolic Blood Pressure: 132 mmHg      Is BP treated: Yes      HDL Cholesterol: 48 mg/dL      Total Cholesterol: 168 mg/dL    #  "Transaminitis  Lab Results   Component Value Date    AST 43 (H) 02/16/2024    AST 26 08/07/2023    AST 32 11/05/2021    ALT 55 (H) 02/16/2024    ALT 33 (H) 08/07/2023    ALT 40 (H) 11/05/2021     #high calcium  Lab Results   Component Value Date    CA 10.4 (H) 02/16/2024    CA 10.2 08/07/2023    CA 10.1 01/03/2023    CA 10.1 11/05/2021           #Essential tremor: Has been worsening over the last few years, saw neurology up in [facility], started on metoprolol  Increased to 125 mg daily  She tried primidone again, went up to 50 mg but felt \"brain fog\" so is now back down to 25 mg and following up with them again in April    #Vitamin D deficiency Currently on 1000 IU daily    DEXA scheduled for 3/7/2024       The following have been reviewed and updated as appropriate in this visit:   Tobacco  Allergies  Meds  Problems  Med Hx  Surg Hx  Fam Hx       ?  Review of Systems   Constitutional: Negative.    HENT: Negative.    Eyes: Negative.    Respiratory: Negative.    Cardiovascular: Negative.    Gastrointestinal: Negative.    Endocrine: Negative.    Genitourinary: Negative.    Musculoskeletal: Positive for arthralgias.   Skin: Negative.    Neurological: Positive for tremors.   Hematological: Negative.    Psychiatric/Behavioral: Negative.      ?  Objective   Physical Exam:  Vitals:    02/20/24 1445   BP: 132/80   BP Location: Left upper arm   Patient Position: Sitting   Pulse: 63   Temp: 36.3 °C (97.4 °F)   TempSrc: Temporal   SpO2: 97%   Weight: 108 kg (237 lb)   Height: 1.753 m (5' 9\")     Physical Exam  Feet:      Right foot:      Protective Sensation: 10 sites tested. 10 sites sensed.      Left foot:      Protective Sensation: 10 sites tested. 10 sites sensed.      Comments: Diabetic foot exam:  Right Foot: skin intact, neurovascularly intact, monofilament felt in All fields  Left Foot: skin intact, neurovascularly intact Monofilament felt in All fields            Assessment/Plan   Alejandra Rocha is a 68 y.o. " female presenting for   Chief Complaint   Patient presents with   • Follow-up      Diagnosis Plan   1. Diet-controlled type 2 diabetes mellitus (CMS/HCC)  CBC and Differential    Comprehensive metabolic panel    Hemoglobin A1c    Lipid panel    Microalbumin/Creatinine Ur Random    DIABETIC FOOT EXAM    CBC and Differential    Comprehensive metabolic panel    Hemoglobin A1c    Lipid panel    Microalbumin/Creatinine Ur Random    Plan to recheck levels in 6 months and if increasing at that time consider metformin versus Jardiance, she wants to make dietary changes, consider edita nutri      2. Hypertension associated with diabetes (CMS/HCC)       Well-controlled on current regimen, no changes      3. Hyperlipidemia associated with type 2 diabetes mellitus (CMS/HCC)       Patient has declined statins in the past, wants to work on diet, aware of high risk, will continue to monitor      4. Transaminitis  Comprehensive metabolic panel    Comprehensive metabolic panel    Unclear if related to recent treatment for UTI, would recommend we repeat levels in 6 weeks and if still elevated will get ultrasound of her abdomen      5. Hypercalcemia  Comprehensive metabolic panel    Comprehensive metabolic panel    Elevated not currently on supplement, will recheck levels if still high consider PTH and vitamin D level      6. Vitamin D deficiency  Vitamin D 25 hydroxy    Vitamin D 25 hydroxy      7. Chronic pain of both knees        8. Essential tremor            Return in about 6 months (around 8/20/2024).

## 2024-03-07 ENCOUNTER — HOSPITAL ENCOUNTER (OUTPATIENT)
Dept: RADIOLOGY | Age: 69
Discharge: HOME | End: 2024-03-07
Attending: FAMILY MEDICINE
Payer: MEDICARE

## 2024-03-07 DIAGNOSIS — Z78.0 ENCOUNTER FOR OSTEOPOROSIS SCREENING IN ASYMPTOMATIC POSTMENOPAUSAL PATIENT: ICD-10-CM

## 2024-03-07 DIAGNOSIS — Z13.820 ENCOUNTER FOR OSTEOPOROSIS SCREENING IN ASYMPTOMATIC POSTMENOPAUSAL PATIENT: ICD-10-CM

## 2024-03-07 PROCEDURE — 77080 DXA BONE DENSITY AXIAL: CPT

## 2024-04-26 LAB
ALBUMIN SERPL-MCNC: 4.2 G/DL (ref 3.9–4.9)
ALBUMIN/GLOB SERPL: 1.4 {RATIO} (ref 1.2–2.2)
ALP SERPL-CCNC: 86 IU/L (ref 44–121)
ALT SERPL-CCNC: 50 IU/L (ref 0–32)
AST SERPL-CCNC: 43 IU/L (ref 0–40)
BILIRUB SERPL-MCNC: 0.3 MG/DL (ref 0–1.2)
BUN SERPL-MCNC: 15 MG/DL (ref 8–27)
BUN/CREAT SERPL: 17 (ref 12–28)
CALCIUM SERPL-MCNC: 10.3 MG/DL (ref 8.7–10.3)
CHLORIDE SERPL-SCNC: 102 MMOL/L (ref 96–106)
CO2 SERPL-SCNC: 23 MMOL/L (ref 20–29)
CREAT SERPL-MCNC: 0.87 MG/DL (ref 0.57–1)
EGFRCR SERPLBLD CKD-EPI 2021: 72 ML/MIN/1.73
GLOBULIN SER CALC-MCNC: 2.9 G/DL (ref 1.5–4.5)
GLUCOSE SERPL-MCNC: 96 MG/DL (ref 70–99)
POTASSIUM SERPL-SCNC: 4.4 MMOL/L (ref 3.5–5.2)
PROT SERPL-MCNC: 7.1 G/DL (ref 6–8.5)
SODIUM SERPL-SCNC: 141 MMOL/L (ref 134–144)

## 2024-04-26 ASSESSMENT — ENCOUNTER SYMPTOMS
EYES NEGATIVE: 1
HEMATOLOGIC/LYMPHATIC NEGATIVE: 1
GASTROINTESTINAL NEGATIVE: 1
ENDOCRINE NEGATIVE: 1
RESPIRATORY NEGATIVE: 1
PSYCHIATRIC NEGATIVE: 1
ARTHRALGIAS: 1
CARDIOVASCULAR NEGATIVE: 1
TREMORS: 1

## 2024-04-26 NOTE — PROGRESS NOTES
CC:   Chief Complaint   Patient presents with    Follow-up     Subjective   Alejandra Rocha is a 68 y.o. female presenting for f/u      Will discuss at appointment on 4/29/2024  Metabolic panel with improved calcium level but AST and ALT are still slightly elevated, stable from 2 months ago--would suggest ultrasound abdomen with elastography    #Type 2 diabetes: Last hemoglobin A1c: 6.5 x2 in 2019  Current regimen: not on diabetic meds, controls with diet and weight loss.  Lab Results   Component Value Date    HGBA1C 6.5 (H) 02/16/2024    HGBA1C 6.2 (H) 08/07/2023    HGBA1C 5.9 (H) 01/03/2023    HGBA1C 6.2 (H) 11/05/2021   Does not check sugars at home  Diabetic foot exam: due  Diabetic eye exam: Up-to-date  Last Microalbumin: Normal        4/29/2024     1:51 PM 2/20/2024     2:45 PM 12/29/2023    11:55 AM 8/11/2023     9:24 AM 1/6/2023    10:37 AM   Weight Trends   Weight (lb) 237 lb 12.8 oz 237 lb 236 lb 235 lb 3.2 oz 235 lb   Weight (kg) 107.865 kg 107.502 kg 107.049 kg 106.686 kg 106.595 kg       #HTN: current regimen: Currently on lisinopril-HCTZ 10-12.5 mg daily and metoprolol 125 mg daily as above   Denies any chest pain, shortness of breath, leg swelling  Patient does check blood pressures at home- <140/70s     #HDL: Cholesterol had increased, patient declined statin and wanted to do diet  The 10-year ASCVD risk score (Timur OROZCO, et al., 2019) is: 22.4%    Values used to calculate the score:      Age: 68 years      Sex: Female      Is Non- : No      Diabetic: Yes      Tobacco smoker: No      Systolic Blood Pressure: 128 mmHg      Is BP treated: Yes      HDL Cholesterol: 48 mg/dL      Total Cholesterol: 168 mg/dL    # Transaminitis--- concern for likely [place] but no imaging on file  Lab Results   Component Value Date    AST 43 (H) 04/25/2024    AST 43 (H) 02/16/2024    AST 26 08/07/2023    AST 32 11/05/2021    ALT 50 (H) 04/25/2024    ALT 55 (H) 02/16/2024    ALT 33 (H) 08/07/2023    ALT  "40 (H) 11/05/2021     #high calcium--- did have calcium in her multivitamin  Lab Results   Component Value Date    CA 10.3 04/25/2024    CA 10.4 (H) 02/16/2024    CA 10.2 08/07/2023    CA 10.1 01/03/2023    CA 10.1 11/05/2021         #Essential tremor: Has been worsening over the last few years, saw neurology up in [facility], started on metoprolol  Increased to 125 mg daily  She tried primidone again, went up to 50 mg but felt \"brain fog\" so is now back down to 25 mg and following up with them    #Vitamin D deficiency Currently on 1000 IU daily    The following have been reviewed and updated as appropriate in this visit:   Tobacco  Allergies  Meds  Problems  Med Hx  Surg Hx  Fam Hx       ?  Review of Systems   Constitutional:  Positive for unexpected weight change.   HENT: Negative.     Eyes: Negative.    Respiratory: Negative.     Cardiovascular: Negative.    Gastrointestinal: Negative.    Endocrine: Negative.    Genitourinary: Negative.    Musculoskeletal:  Positive for arthralgias.   Skin: Negative.    Neurological:  Positive for tremors.   Hematological: Negative.    Psychiatric/Behavioral: Negative.       ?  Objective   Physical Exam:  Vitals:    04/29/24 1351   BP: 128/68   BP Location: Left upper arm   Patient Position: Sitting   Pulse: 70   Temp: 36.4 °C (97.6 °F)   TempSrc: Temporal   SpO2: 95%   Weight: 108 kg (237 lb 12.8 oz)   Height: 1.753 m (5' 9\")     Physical Exam  Constitutional:       Appearance: She is well-developed.   HENT:      Head: Normocephalic and atraumatic.   Cardiovascular:      Rate and Rhythm: Normal rate.   Pulmonary:      Effort: Pulmonary effort is normal.   Skin:     General: Skin is warm and dry.   Neurological:      General: No focal deficit present.      Mental Status: She is alert.      Motor: Tremor present.         Assessment/Plan   Alejandra Rocha is a 68 y.o. female presenting for   Chief Complaint   Patient presents with    Follow-up      Diagnosis Plan   1. " Diet-controlled type 2 diabetes mellitus (CMS/HCC)  empagliflozin (JARDIANCE) 25 mg tablet    semaglutide (OZEMPIC) 0.25 mg or 0.5 mg (2 mg/3 mL) subcutaneous injection      2. Hypertension associated with diabetes (CMS/HCC)  (CMS/HCC)        3. Hyperlipidemia associated with type 2 diabetes mellitus (CMS/HCC)  (CMS/HCC)        4. Transaminitis  ULTRASOUND ABDOMEN LIMITED      5. Essential tremor        6. Obesity, morbid (CMS/HCC)          For her diabetes, plan will be to try to add a medication for control.  Jardiance 25 mg and Ozempic 0.25 mg sent we will see if either is covered and available.  If so she will start on the 1 that is covered.  If not then we will start her on metformin, she is responsible for reaching out to me so we can make this change if needed    For her transaminitis I do suspect likely NAFLD, provided with information for weight loss but we are going to do a confirmatory ultrasound abdomen    Tremor is being managed through neurology and stable with the primidone    **I attest that this visit supports the complexity inherent to evaluation and management associated with medical care services that serve as the continuing focal point for all needed health care services and/or medical care services that are part of ongoing care related to this patient's single, serious condition or a complex condition.    **This note was created with voice recognition software.   Inadvertent dictation errors should be disregarded.         Return in about 15 weeks (around 8/12/2024) for Next scheduled follow-up.

## 2024-04-29 ENCOUNTER — OFFICE VISIT (OUTPATIENT)
Dept: FAMILY MEDICINE | Facility: CLINIC | Age: 69
End: 2024-04-29
Payer: MEDICARE

## 2024-04-29 VITALS
HEART RATE: 70 BPM | HEIGHT: 69 IN | TEMPERATURE: 97.6 F | SYSTOLIC BLOOD PRESSURE: 128 MMHG | WEIGHT: 237.8 LBS | OXYGEN SATURATION: 95 % | DIASTOLIC BLOOD PRESSURE: 68 MMHG | BODY MASS INDEX: 35.22 KG/M2

## 2024-04-29 DIAGNOSIS — E78.5 HYPERLIPIDEMIA ASSOCIATED WITH TYPE 2 DIABETES MELLITUS (CMS/HCC): ICD-10-CM

## 2024-04-29 DIAGNOSIS — E11.9 DIET-CONTROLLED TYPE 2 DIABETES MELLITUS (CMS/HCC): Primary | ICD-10-CM

## 2024-04-29 DIAGNOSIS — G25.0 ESSENTIAL TREMOR: ICD-10-CM

## 2024-04-29 DIAGNOSIS — E11.59 HYPERTENSION ASSOCIATED WITH DIABETES (CMS/HCC): ICD-10-CM

## 2024-04-29 DIAGNOSIS — R74.01 TRANSAMINITIS: ICD-10-CM

## 2024-04-29 DIAGNOSIS — E11.69 HYPERLIPIDEMIA ASSOCIATED WITH TYPE 2 DIABETES MELLITUS (CMS/HCC): ICD-10-CM

## 2024-04-29 DIAGNOSIS — I15.2 HYPERTENSION ASSOCIATED WITH DIABETES (CMS/HCC): ICD-10-CM

## 2024-04-29 DIAGNOSIS — E66.01 OBESITY, MORBID (CMS/HCC): ICD-10-CM

## 2024-04-29 PROCEDURE — G8754 DIAS BP LESS 90: HCPCS | Performed by: FAMILY MEDICINE

## 2024-04-29 PROCEDURE — G8752 SYS BP LESS 140: HCPCS | Performed by: FAMILY MEDICINE

## 2024-04-29 PROCEDURE — 99214 OFFICE O/P EST MOD 30 MIN: CPT | Performed by: FAMILY MEDICINE

## 2024-04-29 PROCEDURE — G2211 COMPLEX E/M VISIT ADD ON: HCPCS | Performed by: FAMILY MEDICINE

## 2024-04-29 RX ORDER — SEMAGLUTIDE 0.68 MG/ML
0.25 INJECTION, SOLUTION SUBCUTANEOUS
Qty: 3 ML | Refills: 0 | Status: SHIPPED | OUTPATIENT
Start: 2024-04-29 | End: 2024-05-28

## 2024-04-29 ASSESSMENT — ENCOUNTER SYMPTOMS: UNEXPECTED WEIGHT CHANGE: 1

## 2024-04-29 NOTE — PATIENT INSTRUCTIONS
Treatment for nonalcoholic fatty liver disease  Your doctor may recommend weight loss to treat nonalcoholic fatty liver disease and nonalcoholic steatohepatitis (NAFLD and ELLSWORTH). Weight loss can help reduce fat in the liver, inflammation (swelling) and fibrosis (scarring).          If you are overweight or have obesity, losing weight by making healthy food choices, limiting portion sizes and being physically active can improve NAFLD and ELLSWORTH. Losing 3-5% of your body weight can lessen the amount of fat in the liver. To reduce liver swelling, you may need to lose up to 10% of your body weight. Doctors recommend slowly losing 7% of your body weight or more over the course of one year. Rapid weight loss through fasting (eating and drinking nothing except water) can make NAFLD worse.     Your gastroenterologist may suggest that you meet with a nutritionist or knowledgeable dietician to talk about and improve your diet. Changing your diet may include eating less fat to help prevent or treat nonalcoholic fatty liver disease or nonalcoholic steatohepatitis.     Fats are high in calories and increase your chance of having obesity. Replacing saturated fats and trans fats in your diet with monounsaturated fats and polyunsaturated fats, such as omega-3 fatty acids, may help lessen your chance of heart disease if you have NAFLD. Four types of fats are:     Saturated fats are found in meat, poultry skin, butter, lard, shortening, and all milk and dairy products except fat-free versions.  Trans fats are found in foods that list hydrogenated or partially hydrogenated oil on the label, such as crackers, snack foods, commercially-baked goods like cookies and cakes, and fried foods.  Monounsaturated fats are found in olive, peanut and canola oils.  Polyunsaturated fats are found in greatest amounts in corn, soybean and safflower oils, and many types of nuts. Omega-3 fatty acids are also a type of polyunsaturated fat, which includes  oily fish such as salmon, walnuts and flaxseed oil.  Your gastroenterologist or nutritionist may also suggest other dietary changes to help treat nonalcoholic fatty liver disease and nonalcoholic steatohepatitis:     Eating more low-glycemic index foods, such as most fruits, vegetables and whole grains.  These foods affect your blood glucose less than high-glycemic index foods, such as white bread, white rice and potatoes.  Not consuming foods and drinks that contain large amounts of simple sugars, especially fructose, which is found in sweetened soft drinks, sports drinks, sweetened tea and juices.  Avoiding alcohol use.  Always speak with your doctor and a nutritionist or knowledgeable dietitian before making changes to your diet, and to help you make a plan you can follow. They may suggest diet plans such as a:     Low glycemic diet.  Low carbohydrate (low carb) diet.  Low carb Mediterranean diet.  Prediabetes diet.  Surgery  If you have severe obesity--a body mass index (BMI) of 35 to 40 or more--and have not been able to lose or maintain weight loss, your gastroenterologist may suggest bariatric surgery, an operation that helps you lose weight by making changes to your digestive system. Bariatric surgery also may be an option if you have serious health problems, such as type 2 diabetes or sleep apnea, related to having obesity. Learn more about bariatric surgery and other weight loss treatments on our patient education page about obesity.     Medicines  Currently, there are no drugs that have been approved to treat nonalcoholic fatty liver disease and nonalcoholic steatohepatitis, but researchers are studying medicines that may improve these conditions.     While some studies have shown that supplements may help improve NAFLD or ELLSWORTH, for safety reasons, always speak with your health care provider before using dietary supplements, such as vitamins, or any complementary or alternative medicines or medical  practices. Some herbal remedies can cause more damage your liver.     Clinical trials  Researchers are studying many aspects of nonalcoholic fatty liver disease and nonalcoholic steatohepatitis through clinical trials, which include:     Building databases of adults and children who have NAFLD.  Comparing how people with and without NAFLD process and metabolize food.  Studying how weight-loss surgery affects NAFLD in adolescents.  Evaluating new medicines/new treatment for ELLSWORTH.  You can view a filtered list of clinical studies on NAFLD and ELLSWORTH that are federally funded, open and recruiting at www.ClinicalTrials.gov. Always talk with your health care provider first before participating in a clinical study.     Support groups  Support groups are available to help you through your diagnosis. Learn more at the [employer] website.     COMPLICATIONS  Most people with nonalcoholic fatty liver disease have simple fatty liver and people with simple fatty liver typically don’t develop complications. However, people with nonalcoholic steatohepatitis have an increased chance of dying from liver-related causes. For example, if cirrhosis develops and progresses to liver failure, you may need a liver transplant. Studies also suggest that people with NAFLD have a greater chance of developing cardiovascular disease, which is the most common cause of death in people who have either form of NAFLD.     It is important to work with your doctor and other health care providers to monitor your condition and make any needed modifications to your diet or daily habits, as well as follow any recommendations to gradually lose weight if you are overweight or have obesity. You may be able to prevent NAFLD and ELLSWORTH by eating a healthy diet, limiting your portion sizes and maintaining a healthy weight.     References     Https://www.niddk.nih.gov/    ________________________    Ozempic    Note: For use as an adjunct to diet and  exercise in patients with a BMI >=30 kg/m2, or in patients with a BMI >=27 kg/m2 and >=1 weight-associated comorbidity (eg, hypertension, dyslipidemia).    SUBQ: Initiate and adjust dose using the following schedule: In patients who do not tolerate a dosage increase, consider delaying the increase for an additional 4 weeks:    Week 1 through week 4 : 0.25 mg once weekly.    Week 5 through week 8: 0.5 mg once weekly.    Week 9 through week 12: 1 mg once weekly.    Week 13 and thereafter (maintenance dosage): 2 mg once weekly; if not tolerated, may temporarily decrease dosage to 1 mg once weekly for up to 4 additional weeks, then increase to 2 mg once weekly.    ______________________    Metformin ramp-up  Week 1: 1 tablet with breakfast  Week 2: 1 tablet with breakfast, 1 tablet with dinner  Week 3: 2 tablets with breakfast, 1 tablet with dinner  Week 4: 2 tablets with breakfast, 2 tablets with dinner

## 2024-05-25 DIAGNOSIS — E11.9 DIET-CONTROLLED TYPE 2 DIABETES MELLITUS (CMS/HCC): ICD-10-CM

## 2024-05-28 RX ORDER — SEMAGLUTIDE 0.68 MG/ML
INJECTION, SOLUTION SUBCUTANEOUS
Qty: 3 ML | Refills: 1 | Status: SHIPPED | OUTPATIENT
Start: 2024-05-28 | End: 2024-06-25 | Stop reason: SDUPTHER

## 2024-05-30 ENCOUNTER — OFFICE VISIT (OUTPATIENT)
Dept: NEUROLOGY | Facility: CLINIC | Age: 69
End: 2024-05-30
Payer: MEDICARE

## 2024-05-30 VITALS — HEART RATE: 70 BPM | DIASTOLIC BLOOD PRESSURE: 70 MMHG | SYSTOLIC BLOOD PRESSURE: 132 MMHG

## 2024-05-30 DIAGNOSIS — G25.0 TREMOR, ESSENTIAL: Primary | ICD-10-CM

## 2024-05-30 PROCEDURE — 99213 OFFICE O/P EST LOW 20 MIN: CPT | Performed by: PSYCHIATRY & NEUROLOGY

## 2024-05-30 NOTE — PATIENT INSTRUCTIONS
Will continue on Metoprolol XL and primidone. Primidone with mild brain fog.    Can try splitting dose of primidone 50mg  to 1/2 tab twice daily.

## 2024-05-30 NOTE — PROGRESS NOTES
Review of Systems   Constitutional:  Negative for appetite change, fatigue and fever.   HENT: Negative.  Negative for hearing loss, tinnitus, trouble swallowing and voice change.    Eyes: Negative.  Negative for photophobia, pain and visual disturbance.   Respiratory: Negative.  Negative for shortness of breath.    Cardiovascular: Negative.  Negative for palpitations.   Gastrointestinal: Negative.  Negative for nausea and vomiting.   Endocrine: Negative.  Negative for cold intolerance.   Genitourinary: Negative.  Negative for dysuria, frequency and urgency.   Musculoskeletal:  Negative for back pain, gait problem, myalgias, neck pain and neck stiffness.   Skin: Negative.  Negative for rash.   Allergic/Immunologic: Negative.    Neurological:  Positive for tremors (Hands, has gotten better). Negative for dizziness, seizures, syncope, facial asymmetry, speech difficulty, weakness, light-headedness, numbness and headaches.   Hematological: Negative.  Does not bruise/bleed easily.   Psychiatric/Behavioral:  Positive for sleep disturbance (At times). Negative for confusion and hallucinations.    All other systems reviewed and are negative.

## 2024-05-30 NOTE — ASSESSMENT & PLAN NOTE
Hand, jaw and vocal tremor present for years consistent with her diagnosis of essential tremor.  Improvement in jaw and hand tremor, particularly handwriting with addition of primidone. Slower titration better tolerated.     Currently on primidone 50mg nightly along with metoprolol XL 25mg daily with adequate response. Reports mild fog and occasional jerks which seem by description to be myoclonic. She does not wish to increase dose of primidone but would like to try splitting the dose to 25mg qam and evening which is reasonable.

## 2024-05-30 NOTE — PROGRESS NOTES
Patient ID: Shakila Ray is a 68 y.o. female    Assessment/Plan:    Tremor, essential  Hand, jaw and vocal tremor present for years consistent with her diagnosis of essential tremor.  Improvement in jaw and hand tremor, particularly handwriting with addition of primidone. Slower titration better tolerated.     Currently on primidone 50mg nightly along with metoprolol XL 25mg daily with adequate response. Reports mild fog and occasional jerks which seem by description to be myoclonic. She does not wish to increase dose of primidone but would like to try splitting the dose to 25mg qam and evening which is reasonable.       Diagnoses and all orders for this visit:    Tremor, essential        Subjective:      Shakila Ray is a  woman with familial essential tremor who presents for follow up.   To review, first seen 10/27/14 with mild tremors. She was diagnosed in [place] by her PCP who noted a vocal tremor during a routine visit in August 2010. Tremor has gradually spread to her head and hands. Given progression she was started on Metoprolol with improvement. She reports over 25 years of tremor.       She remains on metoprolol XL 25mg daily. She did restart primidone again as suggested and slowly worked her way up to 50mg nightly.   She and her family have noted an improvement in tremor.   Handwriting and jaw tremor have improved on primidone. She is able to eat and drink without issues. She has a sensing utensil but has never used it. Tremor cam be present if carrying drinks but she works around this.   She has however noted a mild jerky shake every once in awhile. Sleep can be variable. On primidone she can feel foggy at times.           Objective:    /70 (BP Location: Left arm, Patient Position: Sitting, Cuff Size: Standard)   Pulse 70       Physical Exam  Vitals reviewed.   Eyes:      Extraocular Movements: Extraocular movements intact.      Pupils: Pupils are equal, round, and reactive to light.    Neurological:      Motor: Motor strength is normal.  Psychiatric:         Speech: Speech normal.         Neurological Exam  Mental Status   Oriented to person, place, time and situation. Recent and remote memory are intact. Speech is normal. Follows complex commands. Attention and concentration are normal.    Cranial Nerves  CN II: Visual fields full to confrontation. Right funduscopic exam: not visualized. Left funduscopic exam: not visualized.  CN III, IV, VI: Extraocular movements intact bilaterally. Pupils equal round and reactive to light bilaterally.  CN V: Facial sensation is normal.  CN VII: Full and symmetric facial movement.  CN VIII: Hearing is normal.  CN IX, X: Palate elevates symmetrically  CN XI: Shoulder shrug strength is normal.  CN XII: Tongue midline without atrophy or fasciculations.    Motor   Normal muscle tone. Strength is 5/5 throughout all four extremities.    Sensory  Light touch is normal in upper and lower extremities.     Coordination  Right: Finger-to-nose normal.Left: Finger-to-nose normal.  Moderate on FTN 2,2  Mild postural tremor  Mild intermittent yes yes head tremor  No vocal or jaw tremor.   No leg tremor.   Handwriting improved.  Spirals with tremor but improved compared to last visit.  .    Gait  Casual gait is normal including stance, stride, and arm swing. Able to rise from chair without using arms.               Josiane Fisher MD  Movement disorder physician  [facility]

## 2024-07-15 DIAGNOSIS — E11.9 DIET-CONTROLLED TYPE 2 DIABETES MELLITUS (CMS/HCC): ICD-10-CM

## 2024-07-15 RX ORDER — SEMAGLUTIDE 0.68 MG/ML
INJECTION, SOLUTION SUBCUTANEOUS
Refills: 1 | OUTPATIENT
Start: 2024-07-15

## 2024-08-07 LAB
25(OH)D3+25(OH)D2 SERPL-MCNC: 49.8 NG/ML (ref 30–100)
ALBUMIN SERPL-MCNC: 4.2 G/DL (ref 3.9–4.9)
ALBUMIN/CREAT UR: 13 MG/G CREAT (ref 0–29)
ALP SERPL-CCNC: 95 IU/L (ref 44–121)
ALT SERPL-CCNC: 31 IU/L (ref 0–32)
AST SERPL-CCNC: 25 IU/L (ref 0–40)
BASOPHILS # BLD AUTO: 0.1 X10E3/UL (ref 0–0.2)
BASOPHILS NFR BLD AUTO: 1 %
BILIRUB SERPL-MCNC: <0.2 MG/DL (ref 0–1.2)
BUN SERPL-MCNC: 18 MG/DL (ref 8–27)
BUN/CREAT SERPL: 21 (ref 12–28)
CALCIUM SERPL-MCNC: 10.2 MG/DL (ref 8.7–10.3)
CHLORIDE SERPL-SCNC: 101 MMOL/L (ref 96–106)
CHOLEST SERPL-MCNC: 189 MG/DL (ref 100–199)
CO2 SERPL-SCNC: 20 MMOL/L (ref 20–29)
CREAT SERPL-MCNC: 0.86 MG/DL (ref 0.57–1)
CREAT UR-MCNC: 125 MG/DL
EGFRCR SERPLBLD CKD-EPI 2021: 73 ML/MIN/1.73
EOSINOPHIL # BLD AUTO: 0.2 X10E3/UL (ref 0–0.4)
EOSINOPHIL NFR BLD AUTO: 3 %
ERYTHROCYTE [DISTWIDTH] IN BLOOD BY AUTOMATED COUNT: 13.2 % (ref 11.7–15.4)
GLOBULIN SER CALC-MCNC: 2.9 G/DL (ref 1.5–4.5)
GLUCOSE SERPL-MCNC: 98 MG/DL (ref 70–99)
HBA1C MFR BLD: 6.2 % (ref 4.8–5.6)
HCT VFR BLD AUTO: 39.2 % (ref 34–46.6)
HDLC SERPL-MCNC: 50 MG/DL
HGB BLD-MCNC: 12.8 G/DL (ref 11.1–15.9)
IMM GRANULOCYTES # BLD AUTO: 0 X10E3/UL (ref 0–0.1)
IMM GRANULOCYTES NFR BLD AUTO: 0 %
LDLC SERPL CALC-MCNC: 109 MG/DL (ref 0–99)
LYMPHOCYTES # BLD AUTO: 2.5 X10E3/UL (ref 0.7–3.1)
LYMPHOCYTES NFR BLD AUTO: 33 %
MCH RBC QN AUTO: 29.2 PG (ref 26.6–33)
MCHC RBC AUTO-ENTMCNC: 32.7 G/DL (ref 31.5–35.7)
MCV RBC AUTO: 90 FL (ref 79–97)
MICROALBUMIN UR-MCNC: 16.4 UG/ML
MONOCYTES # BLD AUTO: 0.6 X10E3/UL (ref 0.1–0.9)
MONOCYTES NFR BLD AUTO: 8 %
NEUTROPHILS # BLD AUTO: 4.2 X10E3/UL (ref 1.4–7)
NEUTROPHILS NFR BLD AUTO: 55 %
PLATELET # BLD AUTO: 288 X10E3/UL (ref 150–450)
POTASSIUM SERPL-SCNC: 4.4 MMOL/L (ref 3.5–5.2)
PROT SERPL-MCNC: 7.1 G/DL (ref 6–8.5)
RBC # BLD AUTO: 4.38 X10E6/UL (ref 3.77–5.28)
SODIUM SERPL-SCNC: 138 MMOL/L (ref 134–144)
TRIGL SERPL-MCNC: 175 MG/DL (ref 0–149)
VLDLC SERPL CALC-MCNC: 30 MG/DL (ref 5–40)
WBC # BLD AUTO: 7.6 X10E3/UL (ref 3.4–10.8)

## 2024-08-13 ENCOUNTER — HOSPITAL ENCOUNTER (OUTPATIENT)
Dept: RADIOLOGY | Age: 69
Discharge: HOME | End: 2024-08-13
Attending: FAMILY MEDICINE
Payer: MEDICARE

## 2024-08-13 DIAGNOSIS — R74.01 TRANSAMINITIS: ICD-10-CM

## 2024-08-13 PROCEDURE — 76705 ECHO EXAM OF ABDOMEN: CPT

## 2024-08-19 ENCOUNTER — OFFICE VISIT (OUTPATIENT)
Dept: FAMILY MEDICINE | Facility: CLINIC | Age: 69
End: 2024-08-19
Payer: MEDICARE

## 2024-08-19 VITALS
HEART RATE: 78 BPM | SYSTOLIC BLOOD PRESSURE: 134 MMHG | RESPIRATION RATE: 16 BRPM | OXYGEN SATURATION: 95 % | BODY MASS INDEX: 33.33 KG/M2 | HEIGHT: 69 IN | TEMPERATURE: 97.5 F | WEIGHT: 225 LBS | DIASTOLIC BLOOD PRESSURE: 86 MMHG

## 2024-08-19 DIAGNOSIS — E78.5 HYPERLIPIDEMIA ASSOCIATED WITH TYPE 2 DIABETES MELLITUS (CMS/HCC): ICD-10-CM

## 2024-08-19 DIAGNOSIS — Z00.00 MEDICARE ANNUAL WELLNESS VISIT, INITIAL: Primary | ICD-10-CM

## 2024-08-19 DIAGNOSIS — E11.59 HYPERTENSION ASSOCIATED WITH DIABETES (CMS/HCC): ICD-10-CM

## 2024-08-19 DIAGNOSIS — I15.2 HYPERTENSION ASSOCIATED WITH DIABETES (CMS/HCC): ICD-10-CM

## 2024-08-19 DIAGNOSIS — Z12.31 ENCOUNTER FOR SCREENING MAMMOGRAM FOR MALIGNANT NEOPLASM OF BREAST: ICD-10-CM

## 2024-08-19 DIAGNOSIS — K75.81 NASH (NONALCOHOLIC STEATOHEPATITIS): ICD-10-CM

## 2024-08-19 DIAGNOSIS — E11.69 HYPERLIPIDEMIA ASSOCIATED WITH TYPE 2 DIABETES MELLITUS (CMS/HCC): ICD-10-CM

## 2024-08-19 DIAGNOSIS — G25.0 ESSENTIAL TREMOR: ICD-10-CM

## 2024-08-19 DIAGNOSIS — E66.811 CLASS 1 OBESITY DUE TO EXCESS CALORIES WITH SERIOUS COMORBIDITY AND BODY MASS INDEX (BMI) OF 33.0 TO 33.9 IN ADULT: ICD-10-CM

## 2024-08-19 DIAGNOSIS — E55.9 VITAMIN D DEFICIENCY: ICD-10-CM

## 2024-08-19 DIAGNOSIS — E66.09 CLASS 1 OBESITY DUE TO EXCESS CALORIES WITH SERIOUS COMORBIDITY AND BODY MASS INDEX (BMI) OF 33.0 TO 33.9 IN ADULT: ICD-10-CM

## 2024-08-19 DIAGNOSIS — E11.9 TYPE 2 DIABETES MELLITUS WITHOUT COMPLICATION, WITHOUT LONG-TERM CURRENT USE OF INSULIN (CMS/HCC): ICD-10-CM

## 2024-08-19 PROCEDURE — G0438 PPPS, INITIAL VISIT: HCPCS | Performed by: FAMILY MEDICINE

## 2024-08-19 PROCEDURE — 1123F ACP DISCUSS/DSCN MKR DOCD: CPT | Performed by: FAMILY MEDICINE

## 2024-08-19 ASSESSMENT — PATIENT HEALTH QUESTIONNAIRE - PHQ9: SUM OF ALL RESPONSES TO PHQ9 QUESTIONS 1 & 2: 0

## 2024-08-19 ASSESSMENT — MINI COG
TOTAL SCORE: 5
COMPLETED: YES

## 2024-08-19 NOTE — PROGRESS NOTES
CC:   Chief Complaint   Patient presents with    Medicare Initial Annual Wellness Visit     Subjective   Alejandra Rocha is a 69 y.o. female presenting for MAW     Will discuss at appointment on 8/13/2024  CBC CMP and vitamin D levels normal  A1c controlled at 6.2, decreased from 5 months ago  Cholesterol did increase slightly from 1 year ago with an LDL up at 109, triglycerides up at 175  Microalbumin negative   ?  #Type 2 diabetes: Last hemoglobin A1c: 6.5 x2 in 2019  Current regimen: now on Ozempic 0.5 mg weekly  She gets constipated  Lab Results   Component Value Date    HGBA1C 6.2 (H) 08/06/2024    HGBA1C 6.5 (H) 02/16/2024    HGBA1C 6.2 (H) 08/07/2023    HGBA1C 5.9 (H) 01/03/2023    HGBA1C 6.2 (H) 11/05/2021   Does not check sugars at home  Diabetic foot exam: 2/20/2024  Diabetic eye exam: Up-to-date, 6/7/2024  Last Microalbumin: Normal    #Essential tremor: Has been worsening over the last few years, saw neurology up in [facility], started on metoprolol  Increased to 125 mg daily  She tried primidone but did not tolerate    #HTN: current regimen: Currently on lisinopril-HCTZ 10-12.5 mg daily and metoprolol 125 mg daily as above  Denies any chest pain, shortness of breath, leg swelling  Patient does check blood pressures at home- <140/70s     #HDL: Cholesterol had increased, patient declined statin and wanted to do diet  The 10-year ASCVD risk score (Timur OROZCO, et al., 2019) is: 27.4%    Values used to calculate the score:      Age: 69 years      Sex: Female      Is Non- : No      Diabetic: Yes      Tobacco smoker: No      Systolic Blood Pressure: 134 mmHg      Is BP treated: Yes      HDL Cholesterol: 50 mg/dL      Total Cholesterol: 189 mg/dL    #ELLSWORTH:  prior imaging: Abdomen ultrasound 8/13/2024   IMPRESSION:  1. Hepatic steatosis.  2. Gallstones without acute cholecystitis or biliary ductal dilatation.  Lab Results   Component Value Date    AST 25 08/06/2024    AST 43 (H) 04/25/2024     AST 43 (H) 2024    AST 26 2023    AST 32 2021    ALT 31 2024    ALT 50 (H) 2024    ALT 55 (H) 2024    ALT 33 (H) 2023    ALT 40 (H) 2021     #Vitamin D deficiency Currently on 1000 IU daily    Diet: ok  Exercise: stopped playing pickle  Dental: sees dentist twice yearly  Eye exam: yearly    Work: Retired, previously a teacher of [employer]   Relationship: , lois , currently in 55+ community-- officially retired   Family: 4kids, 5 grandkids; 4 boys, 1 girl--- 14yo down to 4yo   ADLs without issues  Driving without issues   Falls: The patient does not have a history of falls. I did complete a risk assessment for falls. A plan of care for falls was documented.    Advance care planning: Advance care plan discussed and documented in the medical record      LMP: No LMP recorded. Patient is postmenopausal.  OB history:   GYN history: Follows with gynecology    Last mammogram - 2024 and normal    Last DEXA scan 3/7/2024 with osteopenia, worsening from the time prior but for the most part stable  10-Year Probability of Fracture:  Major Osteoporotic: 9.2%  Hip: 1.3%    Cologuard 2023 and normal, repeat 3 years       Patient Care Team:  Callie Belle MD as PCP - General (Family Medicine)  Dori Hernandez (Neurology)  [facility] (Ophthalmology)    Comprehensive Medical and Social History  Patient Active Problem List   Diagnosis    Essential tremor    Vitamin D deficiency    Hypertension associated with diabetes (CMS/HCC)  (CMS/HCC)    Hyperlipidemia associated with type 2 diabetes mellitus (CMS/HCC)  (CMS/HCC)    Chronic pain of both knees    Type 2 diabetes mellitus without complication, without long-term current use of insulin (CMS/HCC)    ELLSWORTH (nonalcoholic steatohepatitis)    Class 1 obesity due to excess calories with serious comorbidity and body mass index (BMI) of 33.0 to 33.9 in adult     Past Medical  "History:   Diagnosis Date    Arthritis     Essential tremor     Hypertension      History reviewed. No pertinent surgical history.  Allergies   Allergen Reactions    Sodium,Potassium,Mag Sulfates      Reaction to red wine      Current Outpatient Medications   Medication Sig Dispense Refill    cholecalciferol, vitamin D3, 1,000 unit (25 mcg) tablet Take 1,000 Units by mouth.      lisinopriL-hydrochlorothiazide (PRINZIDE,ZESTORETIC) 10-12.5 mg per tablet Take 1 tablet by mouth daily. 90 tablet 3    metoprolol succinate XL (TOPROL-XL) 100 mg 24 hr tablet Take 1 tablet (100 mg total) by mouth daily. Take with 25mg 90 tablet 3    metoprolol succinate XL (TOPROL-XL) 25 mg 24 hr tablet Take 1 tablet (25 mg total) by mouth daily. With 100mg for total 125mg 90 tablet 3    multivitamin tablet Take by mouth daily.      primidone (MYSOLINE) 50 mg tablet Take 25 mg by mouth nightly.      semaglutide (OZEMPIC) 1 mg/dose (4 mg/3 mL) subcutaneous injection Inject 1 mg under the skin every (seven) 7 days. 3 mL 2     No current facility-administered medications for this visit.     Social History     Tobacco Use    Smoking status: Never     Passive exposure: Never    Smokeless tobacco: Never   Substance Use Topics    Alcohol use: Yes     Comment: Socially    Drug use: Never     Family History   Problem Relation Age of Onset    Arthritis Biological Mother     Breast cancer Biological Mother     Melanoma Biological Mother     ALS Biological Father     Kidney disease Biological Daughter        Objective   Vitals  Vitals:    08/19/24 1402   BP: 134/86   BP Location: Left upper arm   Patient Position: Sitting   Pulse: 78   Resp: 16   Temp: 36.4 °C (97.5 °F)   TempSrc: Temporal   SpO2: 95%   Weight: 102 kg (225 lb)   Height: 1.753 m (5' 9\")     Body mass index is 33.23 kg/m².    Advanced Care Plan  Does patient have advance directive?: Yes           Does patient have current OOH DNR form?: Yes           Does patient have current POLST?: No "             PHQ  Will the patient answer the depression questions?: Yes   Little interest or pleasure in doing things: Not at all   Feeling down, depressed, or hopeless: Not at all   Depression Risk: 0                                               Mini Cog  Completed: Yes  Score: 5  Result: Negative    Get Up and Go  Result: Pass    STEADI Falls Risk  One or more falls in the last year: Yes   How many times: 1   Was the patient injured in any fall: No   Has trouble stepping up onto a curb: No   Advised to use a cane or walker to get around safely: No   Often has to rush to the toilet: No   Feels unsteady when walking: No   Has lost some feeling in feet: No   Often feels sad or depressed: No   Steadies self on furniture while walking at home: No   Takes medication that makes him/her feel lightheaded or more tired than usual: No   Worried about falling: No   Takes medicine to sleep or improve mood: No   Needs to push with hands when rising from a chair: No   Falls screen completed: Yes     Hearing and Vision Screening  No results found.  See HRA for relevant hearing screening response.    Diet and Exercise             Assessment/Plan   Diagnoses and all orders for this visit:    Medicare annual wellness visit, initial (Primary)  Comments:  Overall doing well, discussed health maintenance and chronic issues as below    Type 2 diabetes mellitus without complication, without long-term current use of insulin (CMS/[facility])  Comments:  Plan increase Ozempic up to 1 mg, discussed approaches to constipation, she is to let me know if not tolerating and we can adjust dose in 3 months  Orders:  -     CBC and Differential; Future  -     Comprehensive metabolic panel; Future  -     Hemoglobin A1c; Future  -     Lipid panel; Future  -     Microalbumin/Creatinine Ur Random; Future  -     semaglutide (OZEMPIC) 1 mg/dose (4 mg/3 mL) subcutaneous injection; Inject 1 mg under the skin every (seven) 7 days.    Hypertension associated with  diabetes (CMS/HCC)  (CMS/HCC)  Comments:  Well-controlled on current regimen no changes  Orders:  -     semaglutide (OZEMPIC) 1 mg/dose (4 mg/3 mL) subcutaneous injection; Inject 1 mg under the skin every (seven) 7 days.    Hyperlipidemia associated with type 2 diabetes mellitus (CMS/HCC)  (CMS/HCC)  -     Lipid panel; Future  -     semaglutide (OZEMPIC) 1 mg/dose (4 mg/3 mL) subcutaneous injection; Inject 1 mg under the skin every (seven) 7 days.    Class 1 obesity due to excess calories with serious comorbidity and body mass index (BMI) of 33.0 to 33.9 in adult    ELLSWORTH (nonalcoholic steatohepatitis)  Comments:  Has lost an additional 12 pounds since April on Ozempic, LFTs have normalized, plan to repeat ultrasound with elastography next year  Orders:  -     Comprehensive metabolic panel; Future  -     semaglutide (OZEMPIC) 1 mg/dose (4 mg/3 mL) subcutaneous injection; Inject 1 mg under the skin every (seven) 7 days.    Essential tremor  Comments:  Stable on current regimen no change    Vitamin D deficiency  Comments:  Continue on supplement  Orders:  -     Vitamin D 25 hydroxy; Future    Encounter for screening mammogram for malignant neoplasm of breast  -     BI SCREENING MAMMOGRAM BILATERAL(TOMOSYNTHESIS); Future        See Patient Instructions (the written plan) which was given to the patient for PPPS and health risk factors with interventions.      Return in about 6 months (around 2/19/2025).

## 2024-08-19 NOTE — PATIENT INSTRUCTIONS
If you develop constipation, increase your water intake to at least 64 ounces a day and start a daily a fiber supplement like Metamucil or FiberCon.  You can also use MiraLAX 1 capful daily or dulcosate (colace or dulcolax)  as a stool softener 1-2 tabs twice daily.  If you go 3 days without a bowel movement, please use milk of magnesium 30 ml every 12 hours for 3 doses or until you have a bowel movement.     _____________________________________    Treatment for nonalcoholic fatty liver disease  Your doctor may recommend weight loss to treat nonalcoholic fatty liver disease and nonalcoholic steatohepatitis (NAFLD and ELLSWORTH). Weight loss can help reduce fat in the liver, inflammation (swelling) and fibrosis (scarring).          If you are overweight or have obesity, losing weight by making healthy food choices, limiting portion sizes and being physically active can improve NAFLD and ELLSWORTH. Losing 3-5% of your body weight can lessen the amount of fat in the liver. To reduce liver swelling, you may need to lose up to 10% of your body weight. Doctors recommend slowly losing 7% of your body weight or more over the course of one year. Rapid weight loss through fasting (eating and drinking nothing except water) can make NAFLD worse.     Your gastroenterologist may suggest that you meet with a nutritionist or knowledgeable dietician to talk about and improve your diet. Changing your diet may include eating less fat to help prevent or treat nonalcoholic fatty liver disease or nonalcoholic steatohepatitis.     Fats are high in calories and increase your chance of having obesity. Replacing saturated fats and trans fats in your diet with monounsaturated fats and polyunsaturated fats, such as omega-3 fatty acids, may help lessen your chance of heart disease if you have NAFLD. Four types of fats are:     Saturated fats are found in meat, poultry skin, butter, lard, shortening, and all milk and dairy products except fat-free  versions.  Trans fats are found in foods that list hydrogenated or partially hydrogenated oil on the label, such as crackers, snack foods, commercially-baked goods like cookies and cakes, and fried foods.  Monounsaturated fats are found in olive, peanut and canola oils.  Polyunsaturated fats are found in greatest amounts in corn, soybean and safflower oils, and many types of nuts. Omega-3 fatty acids are also a type of polyunsaturated fat, which includes oily fish such as salmon, walnuts and flaxseed oil.  Your gastroenterologist or nutritionist may also suggest other dietary changes to help treat nonalcoholic fatty liver disease and nonalcoholic steatohepatitis:     Eating more low-glycemic index foods, such as most fruits, vegetables and whole grains.  These foods affect your blood glucose less than high-glycemic index foods, such as white bread, white rice and potatoes.  Not consuming foods and drinks that contain large amounts of simple sugars, especially fructose, which is found in sweetened soft drinks, sports drinks, sweetened tea and juices.  Avoiding alcohol use.  Always speak with your doctor and a nutritionist or knowledgeable dietitian before making changes to your diet, and to help you make a plan you can follow. They may suggest diet plans such as a:     Low glycemic diet.  Low carbohydrate (low carb) diet.  Low carb Mediterranean diet.  Prediabetes diet.  Surgery  If you have severe obesity--a body mass index (BMI) of 35 to 40 or more--and have not been able to lose or maintain weight loss, your gastroenterologist may suggest bariatric surgery, an operation that helps you lose weight by making changes to your digestive system. Bariatric surgery also may be an option if you have serious health problems, such as type 2 diabetes or sleep apnea, related to having obesity. Learn more about bariatric surgery and other weight loss treatments on our patient education page about obesity.      Medicines  Currently, there are no drugs that have been approved to treat nonalcoholic fatty liver disease and nonalcoholic steatohepatitis, but researchers are studying medicines that may improve these conditions.     While some studies have shown that supplements may help improve NAFLD or ELLSWORTH, for safety reasons, always speak with your health care provider before using dietary supplements, such as vitamins, or any complementary or alternative medicines or medical practices. Some herbal remedies can cause more damage your liver.     Clinical trials  Researchers are studying many aspects of nonalcoholic fatty liver disease and nonalcoholic steatohepatitis through clinical trials, which include:     Building databases of adults and children who have NAFLD.  Comparing how people with and without NAFLD process and metabolize food.  Studying how weight-loss surgery affects NAFLD in adolescents.  Evaluating new medicines/new treatment for ELLSWORTH.  You can view a filtered list of clinical studies on NAFLD and ELLSWORTH that are federally funded, open and recruiting at www.ClinicalTrials.gov. Always talk with your health care provider first before participating in a clinical study.     Support groups  Support groups are available to help you through your diagnosis. Learn more at the [employer] website.     COMPLICATIONS  Most people with nonalcoholic fatty liver disease have simple fatty liver and people with simple fatty liver typically don’t develop complications. However, people with nonalcoholic steatohepatitis have an increased chance of dying from liver-related causes. For example, if cirrhosis develops and progresses to liver failure, you may need a liver transplant. Studies also suggest that people with NAFLD have a greater chance of developing cardiovascular disease, which is the most common cause of death in people who have either form of NAFLD.     It is important to work with your doctor and other  health care providers to monitor your condition and make any needed modifications to your diet or daily habits, as well as follow any recommendations to gradually lose weight if you are overweight or have obesity. You may be able to prevent NAFLD and ELLSWORTH by eating a healthy diet, limiting your portion sizes and maintaining a healthy weight.     References     Https://www.niddk.nih.gov/                               Your Personalized Prevention Plan Services (PPPS)    Preventive Services Checklist (Assumes Average Risk Unless Otherwise Noted):    Health Maintenance Topics with due status: Overdue       Topic Date Due    Depression Screening Never done    Falls Risk Screening Never done    COVID-19 Vaccine 04/11/2024    Influenza Vaccine 08/01/2024     Health Maintenance Topics with due status: Not Due       Topic Last Completion Date    DTaP, Tdap, and Td Vaccines 01/21/2016    Colorectal Cancer Screening 09/07/2023    Diabetic Foot Exam 02/20/2024    DEXA Scan 03/07/2024    Annual Dilated Retinal Exam 06/07/2024    Breast Cancer Screening 06/10/2024    Diabetes Kidney Health Evaluation: eGFR 08/06/2024    Diabetes Kidney Health Evaluation:  uACR 08/06/2024    Hemoglobin A1C 08/06/2024    Medicare Annual Wellness Visit 08/19/2024     Health Maintenance Topics with due status: Completed       Topic Last Completion Date    Zoster Vaccine 05/15/2019    Pneumococcal (65 years and older) 11/08/2019    Hepatitis C Screening 11/05/2021    RSV (60+ years old [shared decision making] or in pregnancy during 32 through 36 weeks) 01/11/2024     Health Maintenance Topics with due status: Aged Out       Topic Date Due    Meningococcal ACWY Aged Out    RSV <20 months Aged Out    HIB Vaccines Aged Out    Hepatitis B Vaccines Aged Out    IPV Vaccines Aged Out    HPV Vaccines Aged Out       You May Be Eligible for These Additional Preventive Services   (Assumes Average Risk Unless Otherwise Noted)  Diabetes Screening Any 1 risk  factor: hypertension, dyslipidemia, obesity, high glucose; or Any 2 risk factors: >=66yo, overweight, family history diabetes (covered every 6 months)   Hepatitis C Screening Any 1 risk factor: 1) blood transfusion before 1992,   2) current or past injection drug use (annually for high risk; if born between 0694-2734, see above for status).   Vaccine: Hepatitis B As necessary if at-risk: hemophilia, ESRD, diabetes, living with individual infected with hep B, healthcare worker with frequent contact with blood/bodily fluids (series covered once)   Sexually Transmitted Diseases (STDs) As necessary chlamydia, gonorrhea, syphilis, hepatitis B (covered annually)  HIV if any 1 risk factor present: 1) <14yo or >66yo and at increased risk or 2) 15-66yo and ask for it (covered annually)   Lung Cancer Screening Low dose chest CT if all three risk factors: 1) 50-78yo, 2) smoker or quit within last 15y, 3) >=20 pack years (covered annually).  No results found for this or any previous visit.       Cholesterol Screening Both risk factors: 1) >=19yo and 2)  increased risk coronary artery disease (covered every 5 years).   Breast Cancer Screening Covered once 35-38yo, annually >=41yo (if >=51yo, see above for status).     Glaucoma Screening Any 1 risk factor: 1) diabetes, 2) family history glaucoma, 3)  >=51yo, 4)  American >=66yo (covered annually)         Health Risk Factors with Personalized Education:  ----------------------------------------------------------------------------------------------------------------------  Controlling Your Blood Pressure  Maintain a normal weight (body mass index between 18.5 and 24.9).  Eat more fruit, vegetables and low-fat dairy.  Eat less saturated fat and total fat.  Lower your sodium (salt) intake.  Try to stay under 1500 mg per day, but if you cannot get your intake to be that low, at least lower it by 1000 mg.  Stay active.  Try to get at least 90 to 150 minutes of  exercise per week.  Try brisk walking, swimming, bicycling or dancing.  Limit alcohol intake.  When you do consume alcohol, drink no more than 1 drink per day.  If you have been prescribed medication, take it regularly and exactly as prescribed.  Let your PCP know if you have any problems or questions about your medication.  Check your blood pressure at home or at the store.  Write down your readings and share them with your PCP  ----------------------------------------------------------------------------------------------------------------------  Controlling Your Diabetes  Stress can raise your blood sugar.  Learn what causes your stress.  Learn to lower your stress by spending time with family and friends, exercising, deep breathing, trying meditation or yoga, enjoying hobbies and getting enough rest.  Let your PCP know if you’re having problems limiting your stress.  Maintain a healthy weight by balancing your diet and exercise.  Choose foods that are lower in calories, saturated fat, trans fat, sugar and salt.  Eat foods with more fiber, like whole grain cereals, bread, crackers, rice or pasta.  Choose to eat fruit, vegetables, and low-fat or fat-free/skim dairy.  Reduce the portion size of your meals.  Make half of your meal vegetables and fruit, and divide the other half between lean protein and whole grains.  Drink water instead of juice and regular soda.  Spend at least some time being active on most days of the week.  Work to increase your muscle strength at least twice per week.  Try things like light weights, stretch bands, yoga, heavy gardening (digging, planting with tools) or push-ups.  If you have been prescribed medication, take it regularly and exactly as prescribed.  Let your PCP know if you have any problems or questions about your medication.  If you have been asked to check your blood sugar, write down your readings and share them with your  PCP  ----------------------------------------------------------------------------------------------------------------------  Controlling Your Cholesterol  Reduce the amount of saturated and trans fat in your diet.  Limit intake of red meat.  Consume only low-fat or non-fat/skim dairy.  Limit fried food.  Cook with vegetable oils.  Reduce your intake of sugary foods, sugary drinks and alcohol.  Eat a diet high in fruit, vegetables and whole grains.  Get protein from fish, poultry and a small portion of nuts.  Stay active.  Try to get at least 90 to 150 minutes of exercise per week.  Try brisk walking, swimming, bicycling or dancing.  Maintain a healthy weight by balancing your diet and exercise.  If you have been prescribed medication, take it regularly and exactly as prescribed. Let your PCP know if you have any problems or questions about your medication.  It’s important to know your cholesterol numbers.  When recommended by your PCP, get the cholesterol blood test.  ----------------------------------------------------------------------------------------------------------------------  Maintaining Strong Bones  Try to get at least 90 to 150 minutes of weight-bearing exercise per week.  Ensure intake of at least 1200mg of calcium per day.  Eat foods high in calcium like milk and other dairy, green vegetables, fruit, canned fish with soft and edible bones, nuts, calcium-set tofu.  Some foods are calcium-fortified, like bread, cereal, fruit juices and mineral water.  Help your body make vitamin D by getting 10-15 minutes per day of sunlight.    Ensure intake of at least 600IU of vitamin D per day.  Eat foods high in vitamin D like oily fish (salmon, sardines, mackerel) and eggs.  Some foods are fortified with vitamin D, like dairy and cereals.  Avoid high amounts of caffeine and salt, since they can cause the body to loose calcium.  Limit alcohol intake, since it is associated with weaker bones and is associated with  falls and fractures.  Limit intake of fizzy drinks.  ----------------------------------------------------------------------------------------------------------------------  Reducing Your Risk of Falls  Tell your PCP if any of your medications make you feel tired, dizzy, lightheaded or off-balance.  Maintain coordination, flexibility and balance by ensuring regular physical activity.  Limit alcohol intake to 1 drink per day.  Consider avoiding all alcohol intake.  Ensure good vision.  Visit an ophthalmologist or optometrist regularly for vision screening or to make sure your glasses / contact lens prescription is correct.  If you need glasses or contacts, wear them.  When you get new glasses or contacts, take time to get used to them.  Do not wear sunglasses or tinted lenses when indoors.  Ensure good hearing.  Have your hearing checked if you are having trouble hearing, or family and friends think you cannot hear them.  If you need a hearing aid, be sure it fits well and wear it.  Get enough rest.  Ensure about 7-9 hours of sleep every day.  Get up slowly from your bed or chairs.  Do not start walking until you are sure you feel steady.  Wear non-skid, rubber-soled, low-heeled shoes.  Do not walk in socks, or in shoes and slippers with smooth soles.  If your PCP or therapist recommends using a cane or walker, use it regularly.  Make your home safer.  Increase lighting throughout the house, especially at the top and bottom of stairs.  Ensure lighting is easily turned on when getting up in the middle of the night.  Make sure there are two secure rails on all stairs.  Install grab bars in the bathtub / shower and near the toilet.  Consider using a shower chair and / or a hand-held shower.  Spread sand or salt on icy surfaces.  Beware of wet surfaces, which can be icy.  Tell your PCP if you have fallen.

## 2024-10-09 DIAGNOSIS — I15.2 HYPERTENSION ASSOCIATED WITH DIABETES (CMS/HCC): ICD-10-CM

## 2024-10-09 DIAGNOSIS — E11.59 HYPERTENSION ASSOCIATED WITH DIABETES (CMS/HCC): ICD-10-CM

## 2024-10-09 DIAGNOSIS — G25.0 ESSENTIAL TREMOR: ICD-10-CM

## 2024-10-09 RX ORDER — METOPROLOL SUCCINATE 100 MG/1
100 TABLET, EXTENDED RELEASE ORAL DAILY
Qty: 90 TABLET | Refills: 3 | Status: SHIPPED | OUTPATIENT
Start: 2024-10-09

## 2024-10-09 RX ORDER — LISINOPRIL AND HYDROCHLOROTHIAZIDE 10; 12.5 MG/1; MG/1
1 TABLET ORAL DAILY
Qty: 90 TABLET | Refills: 3 | Status: SHIPPED | OUTPATIENT
Start: 2024-10-09

## 2024-10-10 DIAGNOSIS — E11.59 HYPERTENSION ASSOCIATED WITH DIABETES (CMS/HCC): ICD-10-CM

## 2024-10-10 DIAGNOSIS — I15.2 HYPERTENSION ASSOCIATED WITH DIABETES (CMS/HCC): ICD-10-CM

## 2024-10-10 DIAGNOSIS — G25.0 ESSENTIAL TREMOR: ICD-10-CM

## 2024-10-10 RX ORDER — METOPROLOL SUCCINATE 25 MG/1
25 TABLET, EXTENDED RELEASE ORAL DAILY
Qty: 90 TABLET | Refills: 3 | Status: SHIPPED | OUTPATIENT
Start: 2024-10-10

## 2024-10-11 DIAGNOSIS — E11.9 DIET-CONTROLLED TYPE 2 DIABETES MELLITUS (CMS/HCC): ICD-10-CM

## 2024-10-11 RX ORDER — SEMAGLUTIDE 0.68 MG/ML
INJECTION, SOLUTION SUBCUTANEOUS
Refills: 1 | OUTPATIENT
Start: 2024-10-11

## 2024-10-15 DIAGNOSIS — G25.0 TREMOR, ESSENTIAL: ICD-10-CM

## 2024-10-15 NOTE — TELEPHONE ENCOUNTER
Reason for call:   [x] Refill   [] Prior Auth  [] Other:     Office:   [] PCP/Provider -   [x] Specialty/Provider - Neuro    Medication: Primidone 50 mg, take 1 tablet by mouth daily at bedtime       Pharmacy: [facility]     Does the patient have enough for 3 days?   [x] Yes   [] No - Send as HP to POD

## 2024-10-16 RX ORDER — PRIMIDONE 50 MG/1
50 TABLET ORAL
Qty: 90 TABLET | Refills: 1 | Status: SHIPPED | OUTPATIENT
Start: 2024-10-16

## 2024-11-22 ENCOUNTER — TELEMEDICINE (OUTPATIENT)
Dept: FAMILY MEDICINE | Facility: CLINIC | Age: 69
End: 2024-11-22
Payer: MEDICARE

## 2024-11-22 VITALS — BODY MASS INDEX: 32.64 KG/M2 | WEIGHT: 221 LBS

## 2024-11-22 DIAGNOSIS — E66.811 CLASS 1 OBESITY DUE TO EXCESS CALORIES WITH SERIOUS COMORBIDITY AND BODY MASS INDEX (BMI) OF 33.0 TO 33.9 IN ADULT: ICD-10-CM

## 2024-11-22 DIAGNOSIS — I15.2 HYPERTENSION ASSOCIATED WITH DIABETES (CMS/HCC): ICD-10-CM

## 2024-11-22 DIAGNOSIS — E11.9 TYPE 2 DIABETES MELLITUS WITHOUT COMPLICATION, WITHOUT LONG-TERM CURRENT USE OF INSULIN (CMS/HCC): Primary | ICD-10-CM

## 2024-11-22 DIAGNOSIS — K75.81 NASH (NONALCOHOLIC STEATOHEPATITIS): ICD-10-CM

## 2024-11-22 DIAGNOSIS — E78.5 HYPERLIPIDEMIA ASSOCIATED WITH TYPE 2 DIABETES MELLITUS (CMS/HCC): ICD-10-CM

## 2024-11-22 DIAGNOSIS — E11.59 HYPERTENSION ASSOCIATED WITH DIABETES (CMS/HCC): ICD-10-CM

## 2024-11-22 DIAGNOSIS — E66.09 CLASS 1 OBESITY DUE TO EXCESS CALORIES WITH SERIOUS COMORBIDITY AND BODY MASS INDEX (BMI) OF 33.0 TO 33.9 IN ADULT: ICD-10-CM

## 2024-11-22 DIAGNOSIS — E11.69 HYPERLIPIDEMIA ASSOCIATED WITH TYPE 2 DIABETES MELLITUS (CMS/HCC): ICD-10-CM

## 2024-11-22 PROCEDURE — 99214 OFFICE O/P EST MOD 30 MIN: CPT | Mod: 95 | Performed by: FAMILY MEDICINE

## 2024-11-22 PROCEDURE — G2211 COMPLEX E/M VISIT ADD ON: HCPCS | Mod: 95 | Performed by: FAMILY MEDICINE

## 2024-11-22 ASSESSMENT — ENCOUNTER SYMPTOMS
EYES NEGATIVE: 1
PSYCHIATRIC NEGATIVE: 1
TREMORS: 1
HEMATOLOGIC/LYMPHATIC NEGATIVE: 1
RESPIRATORY NEGATIVE: 1
ENDOCRINE NEGATIVE: 1
UNEXPECTED WEIGHT CHANGE: 0
CONSTITUTIONAL NEGATIVE: 1
CARDIOVASCULAR NEGATIVE: 1
GASTROINTESTINAL NEGATIVE: 1
ARTHRALGIAS: 1

## 2025-01-13 DIAGNOSIS — G25.0 TREMOR, ESSENTIAL: ICD-10-CM

## 2025-01-15 DIAGNOSIS — G25.0 TREMOR, ESSENTIAL: Primary | ICD-10-CM

## 2025-01-15 RX ORDER — PRIMIDONE 50 MG/1
50 TABLET ORAL
Qty: 90 TABLET | Refills: 1 | Status: SHIPPED | OUTPATIENT
Start: 2025-01-15

## 2025-02-19 ENCOUNTER — TELEPHONE (OUTPATIENT)
Age: 70
End: 2025-02-19

## 2025-02-19 NOTE — TELEPHONE ENCOUNTER
Called pt to discuss Dr. Oviedo's recommendations. Left a message on her voice mail requesting a return call.

## 2025-02-19 NOTE — TELEPHONE ENCOUNTER
Patient called and wanted to see if Dr Oviedo is able to switch her medication Primidone 50 mg to another medication?  Reason why patient would like medication change is that she has noticed while on medication, her hair has gotten thinner with time.    Please assist

## 2025-02-19 NOTE — TELEPHONE ENCOUNTER
Incoming call from patient. Patient stated she called in earlier today and was told to call back for the clinical line. Patient stated she has been taking Primidone for over a year and patient's hair has been noticeably thinning. Patient is wondering if she needs a medication change.      LOV 5-30-24  NOV 5-29-25    # [phone]      Dr. Oviedo, please advise. Thank you!

## 2025-02-19 NOTE — TELEPHONE ENCOUNTER
Josiane Fisher MD  Neurology Neurovascular Team 414 minutes ago (12:13 PM)       Hair loss is not a common side effect of primidone.  If she wishes to taper off to see if this is the case she can take half a tablet for a week and then stop.  Alternative options for treatment of tremor would be topiramate or gabapentin.  Topiramate can cause cognitive fog, return of kidney stones if there is any history, paresthesias.  Gabapentin can cause fog and sedation.

## 2025-02-21 LAB
ALBUMIN SERPL-MCNC: 4.5 G/DL (ref 3.8–4.8)
ALP SERPL-CCNC: 91 IU/L (ref 44–121)
ALT SERPL-CCNC: 28 IU/L (ref 0–32)
AST SERPL-CCNC: 24 IU/L (ref 0–40)
BASOPHILS # BLD AUTO: 0.1 X10E3/UL (ref 0–0.2)
BASOPHILS NFR BLD AUTO: 1 %
BILIRUB SERPL-MCNC: 0.3 MG/DL (ref 0–1.2)
BUN SERPL-MCNC: 21 MG/DL (ref 8–27)
BUN/CREAT SERPL: 23 (ref 12–28)
CALCIUM SERPL-MCNC: 10.4 MG/DL (ref 8.7–10.3)
CHLORIDE SERPL-SCNC: 100 MMOL/L (ref 96–106)
CHOLEST SERPL-MCNC: 197 MG/DL (ref 100–199)
CO2 SERPL-SCNC: 27 MMOL/L (ref 20–29)
CREAT SERPL-MCNC: 0.9 MG/DL (ref 0.57–1)
EGFRCR SERPLBLD CKD-EPI 2021: 68 ML/MIN/1.73
EOSINOPHIL # BLD AUTO: 0.2 X10E3/UL (ref 0–0.4)
EOSINOPHIL NFR BLD AUTO: 2 %
ERYTHROCYTE [DISTWIDTH] IN BLOOD BY AUTOMATED COUNT: 13.2 % (ref 11.7–15.4)
GLOBULIN SER CALC-MCNC: 2.9 G/DL (ref 1.5–4.5)
GLUCOSE SERPL-MCNC: 107 MG/DL (ref 70–99)
HCT VFR BLD AUTO: 43.3 % (ref 34–46.6)
HDLC SERPL-MCNC: 52 MG/DL
HGB BLD-MCNC: 13.9 G/DL (ref 11.1–15.9)
IMM GRANULOCYTES # BLD AUTO: 0 X10E3/UL (ref 0–0.1)
IMM GRANULOCYTES NFR BLD AUTO: 0 %
LDLC SERPL CALC-MCNC: 118 MG/DL (ref 0–99)
LYMPHOCYTES # BLD AUTO: 2.9 X10E3/UL (ref 0.7–3.1)
LYMPHOCYTES NFR BLD AUTO: 39 %
MCH RBC QN AUTO: 29.1 PG (ref 26.6–33)
MCHC RBC AUTO-ENTMCNC: 32.1 G/DL (ref 31.5–35.7)
MCV RBC AUTO: 91 FL (ref 79–97)
MONOCYTES # BLD AUTO: 0.5 X10E3/UL (ref 0.1–0.9)
MONOCYTES NFR BLD AUTO: 7 %
NEUTROPHILS # BLD AUTO: 3.7 X10E3/UL (ref 1.4–7)
NEUTROPHILS NFR BLD AUTO: 51 %
PLATELET # BLD AUTO: 294 X10E3/UL (ref 150–450)
POTASSIUM SERPL-SCNC: 4.8 MMOL/L (ref 3.5–5.2)
PROT SERPL-MCNC: 7.4 G/DL (ref 6–8.5)
RBC # BLD AUTO: 4.78 X10E6/UL (ref 3.77–5.28)
SODIUM SERPL-SCNC: 137 MMOL/L (ref 134–144)
TRIGL SERPL-MCNC: 153 MG/DL (ref 0–149)
VLDLC SERPL CALC-MCNC: 27 MG/DL (ref 5–40)
WBC # BLD AUTO: 7.3 X10E3/UL (ref 3.4–10.8)

## 2025-02-21 NOTE — TELEPHONE ENCOUNTER
Spoke with pt and made her aware of the provider's recommendations. Pt stated that she may decrease the dose of primidone to a half tab to see if she notices a difference in the hair loss.

## 2025-02-22 LAB
25(OH)D3+25(OH)D2 SERPL-MCNC: 45.7 NG/ML (ref 30–100)
ALBUMIN/CREAT UR: 20 MG/G CREAT (ref 0–29)
CREAT UR-MCNC: 68.6 MG/DL
HBA1C MFR BLD: 6.5 % (ref 4.8–5.6)
MICROALBUMIN UR-MCNC: 13.7 UG/ML

## 2025-02-24 ASSESSMENT — ENCOUNTER SYMPTOMS
CONSTITUTIONAL NEGATIVE: 1
EYES NEGATIVE: 1
GASTROINTESTINAL NEGATIVE: 1
ENDOCRINE NEGATIVE: 1
CARDIOVASCULAR NEGATIVE: 1
TREMORS: 1
UNEXPECTED WEIGHT CHANGE: 0
PSYCHIATRIC NEGATIVE: 1
RESPIRATORY NEGATIVE: 1
HEMATOLOGIC/LYMPHATIC NEGATIVE: 1
ARTHRALGIAS: 1

## 2025-02-24 NOTE — PROGRESS NOTES
"Consent obtained from patient and all parties present in the room? yes    I have obtained the consent of everyone present in the room to make an audio recording of this visit to assist me in documenting the encounter in the EMR.     Subjective     Patient ID: Alejandra Rocha, : 1955 is a 69 y.o. female who presents for Diabetes and Obesity (Was on Ozempic but needed to stop due to cost.)    Will discuss at appointment on 2025  CBC normal  CMP with elevated fasting glucose at 107, elevated calcium at 10.4, normal liver function test and normal kidney function  A1c increased from 6.2 to 6.5 off of Ozempic  Cholesterol increased from 109 up to 118 LDL and triglycerides elevated  Microalbumin negative  Vitamin D in a good range  History of Present Illness  The patient presents for a follow-up of diabetes.    \"I have just finished my last prescription pen for Ozempic . I have spoken to my insurance company and they have advised me that my next month ‘s payment will be over 1000 dollars. In January the drug will go up to a different tier and be over 200 dollars.\"   ?  #Type 2 diabetes: She has been off Ozempic 1mg since December due to its cost, which is approximately $ 200 per month. She reports a slight weight gain over the past 1.5 months, although she did not experience any weight gain during the holiday season. Her weight had previously decreased to 222 pounds. She recalls feeling better while on Ozempic, with the exception of mild constipation. She has been attempting to manage her weight through dietary modifications and swimming exercises but has not observed any significant changes. She also reports experiencing some hair thinning.  Lab Results   Component Value Date    HGBA1C 6.5 (H) 2025    HGBA1C 6.2 (H) 2024    HGBA1C 6.5 (H) 2024    HGBA1C 6.2 (H) 2023    HGBA1C 5.9 (H) 2023   Does not check sugars at home  Diabetic foot exam: due  Diabetic eye exam: Up-to-date, " "6/7/2024  Last Microalbumin: Normal      2/25/2025    10:15 AM 11/22/2024    11:36 AM 8/19/2024     2:02 PM 4/29/2024     1:51 PM 2/20/2024     2:45 PM   Weight Trends   Weight (lb) 227 lb 9.6 oz 221 lb 225 lb 237 lb 12.8 oz 237 lb   Weight (kg) 103.239 kg 100.245 kg 102.059 kg 107.865 kg 107.502 kg         She does not take calcium supplements but acknowledges the presence of calcium in her multivitamin.    Review of Systems   Constitutional: Negative.  Negative for unexpected weight change.   HENT: Negative.     Eyes: Negative.    Respiratory: Negative.     Cardiovascular: Negative.    Gastrointestinal: Negative.    Endocrine: Negative.    Genitourinary: Negative.    Musculoskeletal:  Positive for arthralgias.   Skin: Negative.    Neurological:  Positive for tremors.   Hematological: Negative.    Psychiatric/Behavioral: Negative.           The following have been reviewed and updated as appropriate in this visit:   Tobacco  Allergies  Meds  Problems  Med Hx  Surg Hx  Fam Hx       ?  ?  Objective   Vitals:    02/25/25 1015   BP: 124/82   BP Location: Right upper arm   Patient Position: Sitting   Pulse: 74   Resp: 16   Temp: 36.5 °C (97.7 °F)   TempSrc: Temporal   SpO2: 98%   Weight: 103 kg (227 lb 9.6 oz)   Height: 1.753 m (5' 9\")     Physical Exam  Constitutional:       Appearance: She is well-developed.   HENT:      Head: Normocephalic and atraumatic.   Cardiovascular:      Rate and Rhythm: Normal rate and regular rhythm.      Heart sounds: Normal heart sounds.   Pulmonary:      Effort: Pulmonary effort is normal.      Breath sounds: Normal breath sounds.   Feet:      Right foot:      Protective Sensation: 10 sites tested.  10 sites sensed.      Left foot:      Protective Sensation: 10 sites tested.  10 sites sensed.      Comments: Diabetic foot exam:  Right Foot: skin intact, neurovascularly intact, monofilament felt in All fields  Left Foot: skin intact, neurovascularly intact Monofilament felt in All " fields      Skin:     General: Skin is warm and dry.   Neurological:      General: No focal deficit present.      Mental Status: She is alert.         Assessment/Plan   Alejandra Rocha is a 69 y.o. female being called via telemedicine.   Diagnosis Plan   1. Type 2 diabetes mellitus without complication, without long-term current use of insulin (CMS/[facility])  Hemoglobin A1c    Lipid panel    Comprehensive metabolic panel    Microalbumin/Creatinine Ur Random    Hemoglobin A1c    Lipid panel    Comprehensive metabolic panel    Microalbumin/Creatinine Ur Random    DIABETIC FOOT EXAM      2. Hypertension associated with diabetes (CMS/HCC)  (CMS/[facility])  Comprehensive metabolic panel    Microalbumin/Creatinine Ur Random    Comprehensive metabolic panel    Microalbumin/Creatinine Ur Random      3. Hyperlipidemia associated with type 2 diabetes mellitus (CMS/[facility])  (CMS/[facility])  Lipid panel    Comprehensive metabolic panel    Lipid panel    Comprehensive metabolic panel      4. Class 1 obesity due to excess calories with serious comorbidity and body mass index (BMI) of 33.0 to 33.9 in adult        5. Metabolic dysfunction-associated steatohepatitis (MASH)  Comprehensive metabolic panel    Comprehensive metabolic panel      6. Hypercalcemia  PTH, intact    PTH, intact        Assessment & Plan  1. Diabetes Mellitus.  Her A1c levels have increased from 6.2 to 6.5 since discontinuing Ozempic in December. Despite this, her diabetes remains well-controlled through dietary management. She has experienced some weight gain and hair thinning, which may be attributed to the cessation of Ozempic. She has been advised to discontinue her multivitamin due to its calcium content. The potential use of Jardiance was discussed, including its benefits and side effects such as yeast infections. She was informed about the availability of Jardiance at [place] pharmac[facility] for approximately $ 200 for a 100-day supply. She was also educated about the use of  water wipes to prevent yeast infections. She was reassured that her hair thinning could be a side effect of either Ozempic or primidone, but is more likely due to Ozempic. A diabetic foot exam was conducted during this visit. She will undergo blood work prior to her next visit, including a metabolic panel, cholesterol, A1c, parathyroid test, and urine test. If she decides to resume Ozempic, she will inform us and we will initiate her on a low dose of 0.25, gradually increasing to 0.5 and then 1 over a period of 3 months.    2. Elevated Calcium Levels.  Her recent blood work showed slightly elevated calcium levels. She has been advised to discontinue her multivitamin due to its calcium content. A parathyroid test will be conducted during her next blood work to check for secondary causes of high calcium.    3.  Occasion management  She is stable both for hypertension and hyperlipidemia no changes to her current regimen.  Her MASH would be another indication for the GLP-1 inhibitor as above    Follow-up  The patient is scheduled for a follow-up visit in August.           **I attest that this visit supports the complexity inherent to evaluation and management associated with medical care services that serve as the continuing focal point for all needed health care services and/or medical care services that are part of ongoing care related to this patient's single, serious condition or a complex condition.    **This note was created with voice recognition software.   Inadvertent dictation errors should be disregarded.             Return in about 25 weeks (around 8/19/2025) for Next scheduled follow-up.

## 2025-02-25 ENCOUNTER — OFFICE VISIT (OUTPATIENT)
Dept: FAMILY MEDICINE | Facility: CLINIC | Age: 70
End: 2025-02-25
Payer: MEDICARE

## 2025-02-25 VITALS
HEIGHT: 69 IN | WEIGHT: 227.6 LBS | RESPIRATION RATE: 16 BRPM | SYSTOLIC BLOOD PRESSURE: 124 MMHG | BODY MASS INDEX: 33.71 KG/M2 | TEMPERATURE: 97.7 F | DIASTOLIC BLOOD PRESSURE: 82 MMHG | HEART RATE: 74 BPM | OXYGEN SATURATION: 98 %

## 2025-02-25 DIAGNOSIS — E11.69 HYPERLIPIDEMIA ASSOCIATED WITH TYPE 2 DIABETES MELLITUS (CMS/HCC): ICD-10-CM

## 2025-02-25 DIAGNOSIS — E83.52 HYPERCALCEMIA: ICD-10-CM

## 2025-02-25 DIAGNOSIS — E11.59 HYPERTENSION ASSOCIATED WITH DIABETES (CMS/HCC): ICD-10-CM

## 2025-02-25 DIAGNOSIS — E66.811 CLASS 1 OBESITY DUE TO EXCESS CALORIES WITH SERIOUS COMORBIDITY AND BODY MASS INDEX (BMI) OF 33.0 TO 33.9 IN ADULT: ICD-10-CM

## 2025-02-25 DIAGNOSIS — K75.81 METABOLIC DYSFUNCTION-ASSOCIATED STEATOHEPATITIS (MASH): ICD-10-CM

## 2025-02-25 DIAGNOSIS — E66.09 CLASS 1 OBESITY DUE TO EXCESS CALORIES WITH SERIOUS COMORBIDITY AND BODY MASS INDEX (BMI) OF 33.0 TO 33.9 IN ADULT: ICD-10-CM

## 2025-02-25 DIAGNOSIS — I15.2 HYPERTENSION ASSOCIATED WITH DIABETES (CMS/HCC): ICD-10-CM

## 2025-02-25 DIAGNOSIS — E78.5 HYPERLIPIDEMIA ASSOCIATED WITH TYPE 2 DIABETES MELLITUS (CMS/HCC): ICD-10-CM

## 2025-02-25 DIAGNOSIS — E11.9 TYPE 2 DIABETES MELLITUS WITHOUT COMPLICATION, WITHOUT LONG-TERM CURRENT USE OF INSULIN (CMS/HCC): Primary | ICD-10-CM

## 2025-02-25 PROCEDURE — G8752 SYS BP LESS 140: HCPCS | Performed by: FAMILY MEDICINE

## 2025-02-25 PROCEDURE — G2211 COMPLEX E/M VISIT ADD ON: HCPCS | Performed by: FAMILY MEDICINE

## 2025-02-25 PROCEDURE — 99214 OFFICE O/P EST MOD 30 MIN: CPT | Performed by: FAMILY MEDICINE

## 2025-02-25 PROCEDURE — G8754 DIAS BP LESS 90: HCPCS | Performed by: FAMILY MEDICINE

## 2025-02-25 NOTE — PATIENT INSTRUCTIONS
Would recommend trying to get medication through [place] [facility]--once they know their preference of which pharmacy they want to try let us know and we can fax the prescription    [email or web address]    [email or web address]      Jardiance:  [email or web address]    $201 for a 100-day supply, equivalent of $60 per month

## 2025-05-29 ENCOUNTER — OFFICE VISIT (OUTPATIENT)
Dept: NEUROLOGY | Facility: CLINIC | Age: 70
End: 2025-05-29
Payer: MEDICARE

## 2025-05-29 VITALS — DIASTOLIC BLOOD PRESSURE: 74 MMHG | SYSTOLIC BLOOD PRESSURE: 128 MMHG

## 2025-05-29 DIAGNOSIS — G25.0 TREMOR, ESSENTIAL: Primary | ICD-10-CM

## 2025-05-29 PROCEDURE — 99214 OFFICE O/P EST MOD 30 MIN: CPT | Performed by: PSYCHIATRY & NEUROLOGY

## 2025-05-29 RX ORDER — TOPIRAMATE 25 MG/1
TABLET, FILM COATED ORAL
Qty: 90 TABLET | Refills: 6 | Status: SHIPPED | OUTPATIENT
Start: 2025-05-29

## 2025-05-29 NOTE — PROGRESS NOTES
Name: Shakila Ray      : 1955      MRN: [id number]  Encounter Provider: Josiane Fisher MD  Encounter Date: 2025   Encounter department[facility]  :  Assessment & Plan  Tremor, essential  Progressive tremor which is partially responsive.  She has been on low-dose primidone with perhaps partial improvement but started noting hair loss and was questioning whether this could be related.  It is not a common side effect but has been reported.  We discussed the option of further increasing primidone versus switching to an alternative medication such as topiramate.    After discussion we opted to discontinue primidone given that it may be potentially causing hair loss.  Will continue metoprolol.  Will start topiramate 25 mg every morning  For 7 days.  If tolerated further increase to 25 mg in the morning and evening for 7 days then 2 tablets in the morning and 1 tablet in the evening.  Contact the office should you develop any side effects such as cognitive fog, or paresthesias.      Orders:    topiramate (Topamax) 25 mg tablet; 1 tab qam x 7 days then 1 po bid x 7 days then 2 po qam and 1 po q poncho      Assessment & Plan          History of Present Illness     Shakila Ray is a RH woman with familial essential tremor who presents for follow up.   To review, first seen 10/27/14 with mild tremors. She was diagnosed in [place] by her PCP who noted a vocal tremor during a routine visit in 2010. Tremor has gradually spread to her head and hands. Given progression she was started on Metoprolol with improvement. She reports over 25 years of tremor.       She remains on metoprolol XL 25mg daily an primidone 50mg nightly. She has thought her hair was thinning due to medication but she is not sure if this truly the case. She reduce primidone to 1/2 tab nightly. She had not noted any changes in thinning of the hair. She is not sure if tremor is worse but of late she has more  vocal tremor.  She has intermittent head tremors/    Tremor severity can vary time by time. It can be worse when hosting or nervous.   Medications partially dampen tremor.   She has not issues with eating and drinking. She can have some trouble cutting.  No changes in gait or balance. No cognitive changes.         History of Present Illness       Review of Systems I have personally reviewed the MA's review of systems and made changes as necessary.         Objective   /74 (BP Location: Left arm, Patient Position: Sitting, Cuff Size: Large)     Physical Exam  Vitals reviewed.     Eyes:      Extraocular Movements: Extraocular movements intact.      Pupils: Pupils are equal, round, and reactive to light.       Neurological:      Motor: Motor strength is normal.    Psychiatric:         Speech: Speech normal.       Neurological Exam  Mental Status   Oriented to person, place, time and situation. Recent and remote memory are intact. Speech is normal. Follows complex commands. Attention and concentration are normal.    Cranial Nerves  CN II: Visual fields full to confrontation. Right funduscopic exam: not visualized. Left funduscopic exam: not visualized.  CN III, IV, VI: Extraocular movements intact bilaterally. Pupils equal round and reactive to light bilaterally.  CN V: Facial sensation is normal.  CN VII: Full and symmetric facial movement.  CN VIII: Hearing is normal.  CN IX, X: Palate elevates symmetrically  CN XI: Shoulder shrug strength is normal.  CN XII: Tongue midline without atrophy or fasciculations.    Motor   Normal muscle tone. Strength is 5/5 throughout all four extremities.    Sensory  Light touch is normal in upper and lower extremities.     Coordination    Intermittent moderate amplitude tremor.  Mild vocal tremor, easily understood.  Moderate amplitude postural tremor with hands outstretched bilaterally.  Moderate amplitude intention tremor.  She knows, high-frequency.  No rest tremor or  bradykinesia..    Gait  Casual gait is normal including stance, stride, and arm swing. Able to rise from chair without using arms.    Physical Exam      Results        Administrative Statements   I have spent a total time of 30 minutes in caring for this patient on the day of the visit/encounter including Risks and benefits of tx options, Instructions for management, Impressions, Documenting in the medical record, and Obtaining or reviewing history  .

## 2025-05-29 NOTE — ASSESSMENT & PLAN NOTE
Progressive tremor which is partially responsive.  She has been on low-dose primidone with perhaps partial improvement but started noting hair loss and was questioning whether this could be related.  It is not a common side effect but has been reported.  We discussed the option of further increasing primidone versus switching to an alternative medication such as topiramate.    After discussion we opted to discontinue primidone given that it may be potentially causing hair loss.  Will continue metoprolol.  Will start topiramate 25 mg every morning  For 7 days.  If tolerated further increase to 25 mg in the morning and evening for 7 days then 2 tablets in the morning and 1 tablet in the evening.  Contact the office should you develop any side effects such as cognitive fog, or paresthesias.      Orders:    topiramate (Topamax) 25 mg tablet; 1 tab qam x 7 days then 1 po bid x 7 days then 2 po qam and 1 po q poncho

## 2025-05-29 NOTE — PROGRESS NOTES
Review of Systems   Constitutional:  Negative for appetite change, fatigue and fever.   HENT:  Positive for voice change (Voice shakes). Negative for hearing loss, tinnitus and trouble swallowing.    Eyes: Negative.  Negative for photophobia, pain and visual disturbance.   Respiratory: Negative.  Negative for shortness of breath.    Cardiovascular: Negative.  Negative for palpitations.   Gastrointestinal: Negative.  Negative for nausea and vomiting.   Endocrine: Negative.  Negative for cold intolerance.   Genitourinary: Negative.  Negative for dysuria, frequency and urgency.   Musculoskeletal:  Negative for back pain, gait problem, myalgias, neck pain and neck stiffness.   Skin: Negative.  Negative for rash.   Allergic/Immunologic: Negative.    Neurological:  Positive for tremors (Hands and jaw, has gotten a little worse). Negative for dizziness, seizures, syncope, facial asymmetry, speech difficulty, weakness, light-headedness, numbness and headaches.   Hematological: Negative.  Does not bruise/bleed easily.   Psychiatric/Behavioral:  Positive for sleep disturbance (At times). Negative for confusion and hallucinations.    All other systems reviewed and are negative.

## 2025-05-29 NOTE — PATIENT INSTRUCTIONS
After discussion we opted to discontinue primidone given that it may be potentially causing hair loss.  Will continue metoprolol.  Will start topiramate 25 mg every morning  For 7 days.  If tolerated further increase to 25 mg in the morning and evening for 7 days then 2 tablets in the morning and 1 tablet in the evening.  Contact the office should you develop any side effects such as cognitive fog, or paresthesias.

## 2025-08-14 LAB
ALBUMIN SERPL-MCNC: 4.2 G/DL (ref 3.8–4.8)
ALP SERPL-CCNC: 98 IU/L (ref 44–121)
ALT SERPL-CCNC: 31 IU/L (ref 0–32)
AST SERPL-CCNC: 25 IU/L (ref 0–40)
BILIRUB SERPL-MCNC: 0.3 MG/DL (ref 0–1.2)
BUN SERPL-MCNC: 23 MG/DL (ref 8–27)
BUN/CREAT SERPL: 24 (ref 12–28)
CALCIUM SERPL-MCNC: 9.9 MG/DL (ref 8.7–10.3)
CHLORIDE SERPL-SCNC: 104 MMOL/L (ref 96–106)
CHOLEST SERPL-MCNC: 179 MG/DL (ref 100–199)
CO2 SERPL-SCNC: 20 MMOL/L (ref 20–29)
CREAT SERPL-MCNC: 0.96 MG/DL (ref 0.57–1)
EGFRCR SERPLBLD CKD-EPI 2021: 63 ML/MIN/1.73
GLOBULIN SER CALC-MCNC: 2.8 G/DL (ref 1.5–4.5)
GLUCOSE SERPL-MCNC: 103 MG/DL (ref 70–99)
HDLC SERPL-MCNC: 46 MG/DL
LDLC SERPL CALC-MCNC: 102 MG/DL (ref 0–99)
POTASSIUM SERPL-SCNC: 4.3 MMOL/L (ref 3.5–5.2)
PROT SERPL-MCNC: 7 G/DL (ref 6–8.5)
SODIUM SERPL-SCNC: 140 MMOL/L (ref 134–144)
TRIGL SERPL-MCNC: 181 MG/DL (ref 0–149)
VLDLC SERPL CALC-MCNC: 31 MG/DL (ref 5–40)

## 2025-08-15 LAB
ALBUMIN/CREAT UR: 114 MG/G CREAT (ref 0–29)
CREAT UR-MCNC: 90.1 MG/DL
HBA1C MFR BLD: 6.1 % (ref 4.8–5.6)
MICROALBUMIN UR-MCNC: 102.7 UG/ML
PTH-INTACT SERPL-MCNC: 23 PG/ML (ref 15–65)

## 2025-08-18 RX ORDER — TOPIRAMATE 25 MG/1
TABLET, FILM COATED ORAL
COMMUNITY
Start: 2025-05-29

## 2025-08-19 ENCOUNTER — OFFICE VISIT (OUTPATIENT)
Dept: FAMILY MEDICINE | Facility: CLINIC | Age: 70
End: 2025-08-19
Payer: MEDICARE

## 2025-08-19 VITALS
OXYGEN SATURATION: 99 % | TEMPERATURE: 97 F | DIASTOLIC BLOOD PRESSURE: 80 MMHG | BODY MASS INDEX: 33.77 KG/M2 | RESPIRATION RATE: 16 BRPM | WEIGHT: 228 LBS | SYSTOLIC BLOOD PRESSURE: 128 MMHG | HEIGHT: 69 IN | HEART RATE: 68 BPM

## 2025-08-19 DIAGNOSIS — G25.0 ESSENTIAL TREMOR: ICD-10-CM

## 2025-08-19 DIAGNOSIS — Z78.0 POSTMENOPAUSAL STATE: ICD-10-CM

## 2025-08-19 DIAGNOSIS — E11.59 HYPERTENSION ASSOCIATED WITH DIABETES (CMS/HCC): ICD-10-CM

## 2025-08-19 DIAGNOSIS — E11.69 HYPERLIPIDEMIA ASSOCIATED WITH TYPE 2 DIABETES MELLITUS (CMS/HCC): ICD-10-CM

## 2025-08-19 DIAGNOSIS — E66.811 CLASS 1 OBESITY DUE TO EXCESS CALORIES WITH SERIOUS COMORBIDITY AND BODY MASS INDEX (BMI) OF 33.0 TO 33.9 IN ADULT: ICD-10-CM

## 2025-08-19 DIAGNOSIS — K75.81 METABOLIC DYSFUNCTION-ASSOCIATED STEATOHEPATITIS (MASH): ICD-10-CM

## 2025-08-19 DIAGNOSIS — E55.9 VITAMIN D DEFICIENCY: ICD-10-CM

## 2025-08-19 DIAGNOSIS — Z00.00 MEDICARE ANNUAL WELLNESS VISIT, SUBSEQUENT: Primary | ICD-10-CM

## 2025-08-19 DIAGNOSIS — E66.09 CLASS 1 OBESITY DUE TO EXCESS CALORIES WITH SERIOUS COMORBIDITY AND BODY MASS INDEX (BMI) OF 33.0 TO 33.9 IN ADULT: ICD-10-CM

## 2025-08-19 DIAGNOSIS — E11.9 TYPE 2 DIABETES MELLITUS WITHOUT COMPLICATION, WITHOUT LONG-TERM CURRENT USE OF INSULIN (CMS/HCC): ICD-10-CM

## 2025-08-19 DIAGNOSIS — E78.5 HYPERLIPIDEMIA ASSOCIATED WITH TYPE 2 DIABETES MELLITUS (CMS/HCC): ICD-10-CM

## 2025-08-19 DIAGNOSIS — I15.2 HYPERTENSION ASSOCIATED WITH DIABETES (CMS/HCC): ICD-10-CM

## 2025-08-19 PROCEDURE — 99214 OFFICE O/P EST MOD 30 MIN: CPT | Mod: 25 | Performed by: FAMILY MEDICINE

## 2025-08-19 PROCEDURE — G0439 PPPS, SUBSEQ VISIT: HCPCS | Performed by: FAMILY MEDICINE

## 2025-08-19 PROCEDURE — 1124F ACP DISCUSS-NO DSCNMKR DOCD: CPT | Performed by: FAMILY MEDICINE

## 2025-08-19 PROCEDURE — G8754 DIAS BP LESS 90: HCPCS | Performed by: FAMILY MEDICINE

## 2025-08-19 PROCEDURE — G8752 SYS BP LESS 140: HCPCS | Performed by: FAMILY MEDICINE

## 2025-08-19 RX ORDER — LISINOPRIL AND HYDROCHLOROTHIAZIDE 10; 12.5 MG/1; MG/1
1 TABLET ORAL DAILY
Qty: 90 TABLET | Refills: 3 | Status: SHIPPED | OUTPATIENT
Start: 2025-08-19

## 2025-08-19 RX ORDER — METOPROLOL SUCCINATE 100 MG/1
100 TABLET, EXTENDED RELEASE ORAL DAILY
Qty: 90 TABLET | Refills: 3 | Status: SHIPPED | OUTPATIENT
Start: 2025-08-19

## 2025-08-19 RX ORDER — METOPROLOL SUCCINATE 25 MG/1
25 TABLET, EXTENDED RELEASE ORAL DAILY
Qty: 90 TABLET | Refills: 3 | Status: SHIPPED | OUTPATIENT
Start: 2025-08-19

## 2025-08-19 ASSESSMENT — MINI COG
TOTAL SCORE: 5
COMPLETED: YES

## 2025-08-19 ASSESSMENT — PATIENT HEALTH QUESTIONNAIRE - PHQ9: SUM OF ALL RESPONSES TO PHQ9 QUESTIONS 1 & 2: 0
